# Patient Record
Sex: FEMALE | Race: WHITE | NOT HISPANIC OR LATINO
[De-identification: names, ages, dates, MRNs, and addresses within clinical notes are randomized per-mention and may not be internally consistent; named-entity substitution may affect disease eponyms.]

---

## 2022-10-12 ENCOUNTER — NON-APPOINTMENT (OUTPATIENT)
Age: 32
End: 2022-10-12

## 2022-10-19 ENCOUNTER — APPOINTMENT (OUTPATIENT)
Dept: HUMAN REPRODUCTION | Facility: CLINIC | Age: 32
End: 2022-10-19

## 2022-12-20 ENCOUNTER — NON-APPOINTMENT (OUTPATIENT)
Age: 32
End: 2022-12-20

## 2022-12-20 ENCOUNTER — TRANSCRIPTION ENCOUNTER (OUTPATIENT)
Age: 32
End: 2022-12-20

## 2022-12-20 ENCOUNTER — OUTPATIENT (OUTPATIENT)
Dept: OUTPATIENT SERVICES | Facility: HOSPITAL | Age: 32
LOS: 1 days | End: 2022-12-20
Payer: COMMERCIAL

## 2022-12-20 VITALS
WEIGHT: 190.04 LBS | HEIGHT: 62 IN | OXYGEN SATURATION: 98 % | DIASTOLIC BLOOD PRESSURE: 68 MMHG | HEART RATE: 77 BPM | SYSTOLIC BLOOD PRESSURE: 109 MMHG | TEMPERATURE: 98 F | RESPIRATION RATE: 20 BRPM

## 2022-12-20 DIAGNOSIS — Z90.89 ACQUIRED ABSENCE OF OTHER ORGANS: Chronic | ICD-10-CM

## 2022-12-20 DIAGNOSIS — Z11.52 ENCOUNTER FOR SCREENING FOR COVID-19: ICD-10-CM

## 2022-12-20 DIAGNOSIS — O02.1 MISSED ABORTION: ICD-10-CM

## 2022-12-20 PROBLEM — Z00.00 ENCOUNTER FOR PREVENTIVE HEALTH EXAMINATION: Status: ACTIVE | Noted: 2022-12-20

## 2022-12-20 LAB
ANION GAP SERPL CALC-SCNC: 13 MMOL/L — SIGNIFICANT CHANGE UP (ref 5–17)
BLD GP AB SCN SERPL QL: NEGATIVE — SIGNIFICANT CHANGE UP
BUN SERPL-MCNC: 12 MG/DL — SIGNIFICANT CHANGE UP (ref 7–23)
CALCIUM SERPL-MCNC: 9.5 MG/DL — SIGNIFICANT CHANGE UP (ref 8.4–10.5)
CHLORIDE SERPL-SCNC: 101 MMOL/L — SIGNIFICANT CHANGE UP (ref 96–108)
CO2 SERPL-SCNC: 23 MMOL/L — SIGNIFICANT CHANGE UP (ref 22–31)
CREAT SERPL-MCNC: 0.57 MG/DL — SIGNIFICANT CHANGE UP (ref 0.5–1.3)
EGFR: 122 ML/MIN/1.73M2 — SIGNIFICANT CHANGE UP
GLUCOSE SERPL-MCNC: 85 MG/DL — SIGNIFICANT CHANGE UP (ref 70–99)
HCT VFR BLD CALC: 40.2 % — SIGNIFICANT CHANGE UP (ref 34.5–45)
HGB BLD-MCNC: 13 G/DL — SIGNIFICANT CHANGE UP (ref 11.5–15.5)
MCHC RBC-ENTMCNC: 30 PG — SIGNIFICANT CHANGE UP (ref 27–34)
MCHC RBC-ENTMCNC: 32.3 GM/DL — SIGNIFICANT CHANGE UP (ref 32–36)
MCV RBC AUTO: 92.6 FL — SIGNIFICANT CHANGE UP (ref 80–100)
NRBC # BLD: 0 /100 WBCS — SIGNIFICANT CHANGE UP (ref 0–0)
PLATELET # BLD AUTO: 352 K/UL — SIGNIFICANT CHANGE UP (ref 150–400)
POTASSIUM SERPL-MCNC: 3.9 MMOL/L — SIGNIFICANT CHANGE UP (ref 3.5–5.3)
POTASSIUM SERPL-SCNC: 3.9 MMOL/L — SIGNIFICANT CHANGE UP (ref 3.5–5.3)
RBC # BLD: 4.34 M/UL — SIGNIFICANT CHANGE UP (ref 3.8–5.2)
RBC # FLD: 13.7 % — SIGNIFICANT CHANGE UP (ref 10.3–14.5)
RH IG SCN BLD-IMP: POSITIVE — SIGNIFICANT CHANGE UP
SARS-COV-2 RNA SPEC QL NAA+PROBE: SIGNIFICANT CHANGE UP
SODIUM SERPL-SCNC: 137 MMOL/L — SIGNIFICANT CHANGE UP (ref 135–145)
WBC # BLD: 8.82 K/UL — SIGNIFICANT CHANGE UP (ref 3.8–10.5)
WBC # FLD AUTO: 8.82 K/UL — SIGNIFICANT CHANGE UP (ref 3.8–10.5)

## 2022-12-20 PROCEDURE — U0003: CPT

## 2022-12-20 PROCEDURE — 80048 BASIC METABOLIC PNL TOTAL CA: CPT

## 2022-12-20 PROCEDURE — 36415 COLL VENOUS BLD VENIPUNCTURE: CPT

## 2022-12-20 PROCEDURE — 86901 BLOOD TYPING SEROLOGIC RH(D): CPT

## 2022-12-20 PROCEDURE — 85027 COMPLETE CBC AUTOMATED: CPT

## 2022-12-20 PROCEDURE — C9803: CPT

## 2022-12-20 PROCEDURE — 86900 BLOOD TYPING SEROLOGIC ABO: CPT

## 2022-12-20 PROCEDURE — G0463: CPT

## 2022-12-20 PROCEDURE — 86850 RBC ANTIBODY SCREEN: CPT

## 2022-12-20 PROCEDURE — U0005: CPT

## 2022-12-20 RX ORDER — SODIUM CHLORIDE 9 MG/ML
1000 INJECTION, SOLUTION INTRAVENOUS
Refills: 0 | Status: DISCONTINUED | OUTPATIENT
Start: 2022-12-21 | End: 2023-01-04

## 2022-12-20 RX ORDER — LIDOCAINE HCL 20 MG/ML
0.2 VIAL (ML) INJECTION ONCE
Refills: 0 | Status: DISCONTINUED | OUTPATIENT
Start: 2022-12-21 | End: 2023-01-04

## 2022-12-20 NOTE — H&P PST ADULT - NSICDXPROCEDURE_GEN_ALL_CORE_FT
PROCEDURES:  D&C, uterus, therapeutic, for incomplete, missed, septic, or induced  20-Dec-2022 17:19:51  Naima Romero A

## 2022-12-20 NOTE — H&P PST ADULT - HISTORY OF PRESENT ILLNESS
33 yo female  recent IUI , LMP 10/10/22, began spotting and ultrasound shows no FHR.  now for D&C.    Covid swab 22 [facility]  denies covid exposure

## 2022-12-21 ENCOUNTER — OUTPATIENT (OUTPATIENT)
Dept: OUTPATIENT SERVICES | Facility: HOSPITAL | Age: 32
LOS: 1 days | End: 2022-12-21
Payer: COMMERCIAL

## 2022-12-21 ENCOUNTER — TRANSCRIPTION ENCOUNTER (OUTPATIENT)
Age: 32
End: 2022-12-21

## 2022-12-21 VITALS
RESPIRATION RATE: 16 BRPM | DIASTOLIC BLOOD PRESSURE: 62 MMHG | OXYGEN SATURATION: 100 % | HEART RATE: 77 BPM | TEMPERATURE: 98 F | SYSTOLIC BLOOD PRESSURE: 105 MMHG

## 2022-12-21 VITALS
SYSTOLIC BLOOD PRESSURE: 99 MMHG | RESPIRATION RATE: 16 BRPM | DIASTOLIC BLOOD PRESSURE: 59 MMHG | HEART RATE: 70 BPM | OXYGEN SATURATION: 99 %

## 2022-12-21 DIAGNOSIS — O02.1 MISSED ABORTION: ICD-10-CM

## 2022-12-21 DIAGNOSIS — Z90.89 ACQUIRED ABSENCE OF OTHER ORGANS: Chronic | ICD-10-CM

## 2022-12-21 LAB — RH IG SCN BLD-IMP: POSITIVE — SIGNIFICANT CHANGE UP

## 2022-12-21 PROCEDURE — 88233 TISSUE CULTURE SKIN/BIOPSY: CPT

## 2022-12-21 PROCEDURE — 88305 TISSUE EXAM BY PATHOLOGIST: CPT | Mod: 26

## 2022-12-21 PROCEDURE — 88280 CHROMOSOME KARYOTYPE STUDY: CPT

## 2022-12-21 PROCEDURE — 88305 TISSUE EXAM BY PATHOLOGIST: CPT

## 2022-12-21 PROCEDURE — 88264 CHROMOSOME ANALYSIS 20-25: CPT

## 2022-12-21 PROCEDURE — 59820 CARE OF MISCARRIAGE: CPT

## 2022-12-21 RX ORDER — SERTRALINE 25 MG/1
1 TABLET, FILM COATED ORAL
Qty: 0 | Refills: 0 | DISCHARGE

## 2022-12-21 RX ORDER — METFORMIN HYDROCHLORIDE 850 MG/1
1 TABLET ORAL
Qty: 0 | Refills: 0 | DISCHARGE

## 2022-12-21 RX ORDER — DOXYLAMINE SUCCINATE AND PYRIDOXINE HYDROCHLORIDE, DELAYED RELEASE TABLETS 10 MG/10 MG 10; 10 MG/1; MG/1
2 TABLET, DELAYED RELEASE ORAL
Qty: 0 | Refills: 0 | DISCHARGE

## 2022-12-21 RX ADMIN — SODIUM CHLORIDE 100 MILLILITER(S): 9 INJECTION, SOLUTION INTRAVENOUS at 11:01

## 2022-12-21 NOTE — ASU DISCHARGE PLAN (ADULT/PEDIATRIC) - CARE PROVIDER_API CALL
Fuentes Vera)  Obstetrics and Gynecology  [address] [place] [address], [address]  [place] [address]  Phone: [phone]  Fax: [phone]  Follow Up Time:

## 2022-12-21 NOTE — BRIEF OPERATIVE NOTE - NSICDXBRIEFPROCEDURE_GEN_ALL_CORE_FT
PROCEDURES:  Dilation and curettage, uterus, using suction, for missed first trimester  21-Dec-2022 12:46:54  Fuentes Vera

## 2022-12-21 NOTE — ASU DISCHARGE PLAN (ADULT/PEDIATRIC) - NURSING INSTRUCTIONS
OK to take Tylenol/Acetaminophen at 6:30PM______ for pain and every 6 hours after as needed. OK to take Motrin/Ibuprofen at 6:30PM_____ for pain and every 6 hours after as needed. Take pain medicines alternate.

## 2022-12-21 NOTE — ASU PATIENT PROFILE, ADULT - FALL HARM RISK - UNIVERSAL INTERVENTIONS
Bed in lowest position, wheels locked, appropriate side rails in place/Call bell, personal items and telephone in reach/Instruct patient to call for assistance before getting out of bed or chair/Non-slip footwear when patient is out of bed/[place] to call system/Physically safe environment - no spills, clutter or unnecessary equipment/Purposeful Proactive Rounding/Room/bathroom lighting operational, light cord in reach

## 2023-01-02 LAB — SURGICAL PATHOLOGY STUDY: SIGNIFICANT CHANGE UP

## 2023-01-11 LAB — CHROM ANALY OVERALL INTERP SPEC-IMP: SIGNIFICANT CHANGE UP

## 2023-02-03 ENCOUNTER — APPOINTMENT (OUTPATIENT)
Dept: HUMAN REPRODUCTION | Facility: CLINIC | Age: 33
End: 2023-02-03
Payer: COMMERCIAL

## 2023-02-03 ENCOUNTER — TRANSCRIPTION ENCOUNTER (OUTPATIENT)
Age: 33
End: 2023-02-03

## 2023-02-03 PROCEDURE — 99499PP: CUSTOM

## 2023-03-27 PROBLEM — Z87.42 PERSONAL HISTORY OF OTHER DISEASES OF THE FEMALE GENITAL TRACT: Chronic | Status: ACTIVE | Noted: 2022-12-20

## 2023-03-27 PROBLEM — M26.609 UNSPECIFIED TEMPOROMANDIBULAR JOINT DISORDER, UNSPECIFIED SIDE: Chronic | Status: ACTIVE | Noted: 2022-12-20

## 2023-03-30 NOTE — PRE-ANESTHESIA EVALUATION ADULT - TEMPERATURE IN CELSIUS (DEGREES C)
Is This A New Presentation, Or A Follow-Up?: Rash
36.8
Additional History: Pt states by eye and nose is dry and itchy. Been dry and itchy “for awhile” but redness started few days ago. Uses lotions a few times a week which seems to help.

## 2023-04-05 ENCOUNTER — APPOINTMENT (OUTPATIENT)
Dept: HUMAN REPRODUCTION | Facility: CLINIC | Age: 33
End: 2023-04-05
Payer: COMMERCIAL

## 2023-04-05 PROCEDURE — 76831P: CUSTOM

## 2023-04-05 PROCEDURE — 76831 ECHO EXAM UTERUS: CPT | Mod: NC

## 2023-04-05 PROCEDURE — 58974P52: CUSTOM | Mod: 52

## 2023-04-05 PROCEDURE — 74740 X-RAY FEMALE GENITAL TRACT: CPT | Mod: NC

## 2023-05-02 ENCOUNTER — APPOINTMENT (OUTPATIENT)
Dept: HUMAN REPRODUCTION | Facility: CLINIC | Age: 33
End: 2023-05-02
Payer: COMMERCIAL

## 2023-05-02 PROCEDURE — 99213 OFFICE O/P EST LOW 20 MIN: CPT | Mod: NC

## 2023-05-02 PROCEDURE — 76857 US EXAM PELVIC LIMITED: CPT | Mod: NC

## 2023-05-02 PROCEDURE — 36415 COLL VENOUS BLD VENIPUNCTURE: CPT | Mod: NC

## 2023-05-09 ENCOUNTER — APPOINTMENT (OUTPATIENT)
Dept: HUMAN REPRODUCTION | Facility: CLINIC | Age: 33
End: 2023-05-09
Payer: COMMERCIAL

## 2023-05-09 PROCEDURE — 76857 US EXAM PELVIC LIMITED: CPT | Mod: NC

## 2023-05-09 PROCEDURE — 36415 COLL VENOUS BLD VENIPUNCTURE: CPT | Mod: NC

## 2023-05-09 PROCEDURE — 99213 OFFICE O/P EST LOW 20 MIN: CPT | Mod: NC

## 2023-05-11 ENCOUNTER — APPOINTMENT (OUTPATIENT)
Dept: HUMAN REPRODUCTION | Facility: CLINIC | Age: 33
End: 2023-05-11
Payer: COMMERCIAL

## 2023-05-11 PROCEDURE — S4035P: CUSTOM

## 2023-05-11 PROCEDURE — 76857 US EXAM PELVIC LIMITED: CPT | Mod: NC

## 2023-05-11 PROCEDURE — 36415 COLL VENOUS BLD VENIPUNCTURE: CPT | Mod: NC

## 2023-05-11 PROCEDURE — 99213 OFFICE O/P EST LOW 20 MIN: CPT | Mod: NC

## 2023-05-14 ENCOUNTER — APPOINTMENT (OUTPATIENT)
Dept: HUMAN REPRODUCTION | Facility: CLINIC | Age: 33
End: 2023-05-14

## 2023-06-06 ENCOUNTER — APPOINTMENT (OUTPATIENT)
Dept: HUMAN REPRODUCTION | Facility: CLINIC | Age: 33
End: 2023-06-06
Payer: COMMERCIAL

## 2023-06-06 PROCEDURE — 99213 OFFICE O/P EST LOW 20 MIN: CPT | Mod: 25

## 2023-06-06 PROCEDURE — 76817 TRANSVAGINAL US OBSTETRIC: CPT

## 2023-06-06 PROCEDURE — 36415 COLL VENOUS BLD VENIPUNCTURE: CPT

## 2023-06-13 ENCOUNTER — APPOINTMENT (OUTPATIENT)
Dept: HUMAN REPRODUCTION | Facility: CLINIC | Age: 33
End: 2023-06-13
Payer: COMMERCIAL

## 2023-06-13 PROCEDURE — 99213 OFFICE O/P EST LOW 20 MIN: CPT | Mod: 25

## 2023-06-13 PROCEDURE — 76817 TRANSVAGINAL US OBSTETRIC: CPT

## 2023-06-13 PROCEDURE — 36415 COLL VENOUS BLD VENIPUNCTURE: CPT

## 2023-06-16 ENCOUNTER — APPOINTMENT (OUTPATIENT)
Dept: HUMAN REPRODUCTION | Facility: CLINIC | Age: 33
End: 2023-06-16
Payer: COMMERCIAL

## 2023-06-16 PROCEDURE — 99213 OFFICE O/P EST LOW 20 MIN: CPT | Mod: 25

## 2023-06-16 PROCEDURE — 76857 US EXAM PELVIC LIMITED: CPT

## 2023-07-12 ENCOUNTER — APPOINTMENT (OUTPATIENT)
Dept: OBGYN | Facility: CLINIC | Age: 33
End: 2023-07-12

## 2023-07-21 ENCOUNTER — APPOINTMENT (OUTPATIENT)
Dept: OBGYN | Facility: CLINIC | Age: 33
End: 2023-07-21
Payer: COMMERCIAL

## 2023-07-21 PROCEDURE — 76813 OB US NUCHAL MEAS 1 GEST: CPT

## 2023-07-21 PROCEDURE — 75870 VEIN X-RAY SKULL: CPT

## 2023-07-21 PROCEDURE — 0502F SUBSEQUENT PRENATAL CARE: CPT

## 2023-08-18 ENCOUNTER — APPOINTMENT (OUTPATIENT)
Dept: OBGYN | Facility: CLINIC | Age: 33
End: 2023-08-18
Payer: COMMERCIAL

## 2023-08-18 PROCEDURE — 0502F SUBSEQUENT PRENATAL CARE: CPT

## 2023-08-18 PROCEDURE — 36415 COLL VENOUS BLD VENIPUNCTURE: CPT

## 2023-09-20 ENCOUNTER — APPOINTMENT (OUTPATIENT)
Dept: ANTEPARTUM | Facility: CLINIC | Age: 33
End: 2023-09-20
Payer: COMMERCIAL

## 2023-09-20 ENCOUNTER — ASOB RESULT (OUTPATIENT)
Age: 33
End: 2023-09-20

## 2023-09-20 ENCOUNTER — APPOINTMENT (OUTPATIENT)
Dept: OBGYN | Facility: CLINIC | Age: 33
End: 2023-09-20
Payer: COMMERCIAL

## 2023-09-20 PROCEDURE — 0502F SUBSEQUENT PRENATAL CARE: CPT

## 2023-09-20 PROCEDURE — 76811 OB US DETAILED SNGL FETUS: CPT

## 2023-09-21 NOTE — H&P PST ADULT - ALCOHOL USE HISTORY SINGLE SELECT
Now chest pain-free.  Not a candidate for cardiac catheterization at the current time because of acute kidney injury.  Continue medical management   yes...

## 2023-10-12 ENCOUNTER — APPOINTMENT (OUTPATIENT)
Dept: OBGYN | Facility: CLINIC | Age: 33
End: 2023-10-12
Payer: COMMERCIAL

## 2023-10-12 PROCEDURE — 0502F SUBSEQUENT PRENATAL CARE: CPT

## 2023-11-03 ENCOUNTER — APPOINTMENT (OUTPATIENT)
Dept: PEDIATRIC CARDIOLOGY | Facility: CLINIC | Age: 33
End: 2023-11-03
Payer: COMMERCIAL

## 2023-11-03 PROCEDURE — 76825 ECHO EXAM OF FETAL HEART: CPT

## 2023-11-03 PROCEDURE — 76827 ECHO EXAM OF FETAL HEART: CPT

## 2023-11-03 PROCEDURE — 76821 MIDDLE CEREBRAL ARTERY ECHO: CPT

## 2023-11-03 PROCEDURE — 93325 DOPPLER ECHO COLOR FLOW MAPG: CPT | Mod: 59

## 2023-11-03 PROCEDURE — 99202 OFFICE O/P NEW SF 15 MIN: CPT | Mod: 25

## 2023-11-03 PROCEDURE — 76820 UMBILICAL ARTERY ECHO: CPT

## 2023-11-10 ENCOUNTER — APPOINTMENT (OUTPATIENT)
Dept: OBGYN | Facility: CLINIC | Age: 33
End: 2023-11-10
Payer: COMMERCIAL

## 2023-11-10 PROCEDURE — 90686 IIV4 VACC NO PRSV 0.5 ML IM: CPT

## 2023-11-10 PROCEDURE — 36415 COLL VENOUS BLD VENIPUNCTURE: CPT

## 2023-11-10 PROCEDURE — 90471 IMMUNIZATION ADMIN: CPT

## 2023-11-10 PROCEDURE — 0503F POSTPARTUM CARE VISIT: CPT

## 2023-12-06 ENCOUNTER — APPOINTMENT (OUTPATIENT)
Dept: OBGYN | Facility: CLINIC | Age: 33
End: 2023-12-06
Payer: COMMERCIAL

## 2023-12-06 PROCEDURE — 76819 FETAL BIOPHYS PROFIL W/O NST: CPT | Mod: 59

## 2023-12-06 PROCEDURE — 0502F SUBSEQUENT PRENATAL CARE: CPT

## 2023-12-06 PROCEDURE — 90471 IMMUNIZATION ADMIN: CPT

## 2023-12-06 PROCEDURE — 90715 TDAP VACCINE 7 YRS/> IM: CPT

## 2023-12-06 PROCEDURE — 76816 OB US FOLLOW-UP PER FETUS: CPT

## 2023-12-21 ENCOUNTER — APPOINTMENT (OUTPATIENT)
Dept: OBGYN | Facility: CLINIC | Age: 33
End: 2023-12-21
Payer: COMMERCIAL

## 2023-12-21 PROCEDURE — 0502F SUBSEQUENT PRENATAL CARE: CPT

## 2024-01-05 ENCOUNTER — APPOINTMENT (OUTPATIENT)
Dept: OBGYN | Facility: CLINIC | Age: 34
End: 2024-01-05
Payer: COMMERCIAL

## 2024-01-05 PROCEDURE — 76816 OB US FOLLOW-UP PER FETUS: CPT

## 2024-01-05 PROCEDURE — 76818 FETAL BIOPHYS PROFILE W/NST: CPT | Mod: 59

## 2024-01-05 PROCEDURE — 0502F SUBSEQUENT PRENATAL CARE: CPT

## 2024-01-12 ENCOUNTER — APPOINTMENT (OUTPATIENT)
Dept: OBGYN | Facility: CLINIC | Age: 34
End: 2024-01-12
Payer: COMMERCIAL

## 2024-01-12 PROCEDURE — 0502F SUBSEQUENT PRENATAL CARE: CPT

## 2024-01-12 PROCEDURE — 76818 FETAL BIOPHYS PROFILE W/NST: CPT

## 2024-01-18 ENCOUNTER — APPOINTMENT (OUTPATIENT)
Dept: OBGYN | Facility: CLINIC | Age: 34
End: 2024-01-18
Payer: COMMERCIAL

## 2024-01-18 PROCEDURE — 76816 OB US FOLLOW-UP PER FETUS: CPT

## 2024-01-18 PROCEDURE — 76818 FETAL BIOPHYS PROFILE W/NST: CPT | Mod: 59

## 2024-01-18 PROCEDURE — 0502F SUBSEQUENT PRENATAL CARE: CPT

## 2024-01-26 ENCOUNTER — APPOINTMENT (OUTPATIENT)
Dept: OBGYN | Facility: CLINIC | Age: 34
End: 2024-01-26
Payer: COMMERCIAL

## 2024-01-26 PROCEDURE — 59426 ANTEPARTUM CARE ONLY: CPT

## 2024-01-26 PROCEDURE — 0502F SUBSEQUENT PRENATAL CARE: CPT

## 2024-01-26 PROCEDURE — 76818 FETAL BIOPHYS PROFILE W/NST: CPT

## 2024-02-01 ENCOUNTER — OUTPATIENT (OUTPATIENT)
Dept: OUTPATIENT SERVICES | Facility: HOSPITAL | Age: 34
LOS: 1 days | End: 2024-02-01
Payer: COMMERCIAL

## 2024-02-01 VITALS
RESPIRATION RATE: 18 BRPM | HEART RATE: 78 BPM | SYSTOLIC BLOOD PRESSURE: 122 MMHG | DIASTOLIC BLOOD PRESSURE: 79 MMHG | TEMPERATURE: 98 F | OXYGEN SATURATION: 99 %

## 2024-02-01 DIAGNOSIS — O26.899 OTHER SPECIFIED PREGNANCY RELATED CONDITIONS, UNSPECIFIED TRIMESTER: ICD-10-CM

## 2024-02-01 DIAGNOSIS — Z90.89 ACQUIRED ABSENCE OF OTHER ORGANS: Chronic | ICD-10-CM

## 2024-02-01 PROCEDURE — 59025 FETAL NON-STRESS TEST: CPT | Mod: 26

## 2024-02-01 PROCEDURE — 99221 1ST HOSP IP/OBS SF/LOW 40: CPT | Mod: 25

## 2024-02-01 RX ORDER — SODIUM CHLORIDE 9 MG/ML
1000 INJECTION, SOLUTION INTRAVENOUS ONCE
Refills: 0 | Status: DISCONTINUED | OUTPATIENT
Start: 2024-02-01 | End: 2024-02-16

## 2024-02-01 NOTE — OB PROVIDER TRIAGE NOTE - HISTORY OF PRESENT ILLNESS
OB PA Triage Note     33yoF  @39.6 weeks gestation (CALLIE 24) presents for ROL. Pt c/o painful contractions occurring irregularly every 5-6 minutes since 730p. Pt denies any leakage of fluid or vaginal bleeding. Endorses +FM. PNC uncomplicated. GBS positive. 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 Pt is Dermatologist, and has a .     – PNC: AMA. GBS positive. EFW 3700g. Expecting boy.   – OBHx:   () MAB s/p D&C  () eTOP (medical)   – GynHx: h/o PCOS; denies h/o fibroids, abnl pap smears, STDs  – PMH: denies h/o HTN, DM, asthma, thyroid disorders, bleeding disorders, h/o blood transfusions   – PSH: h/o tonsillectomy and eye surgery (4yo)   – Psych: h/o anxiety/depression (on Zoloft)   – Social: denies alcohol/tobacco/drug use in pregnancy   – Meds: PNV, Metformin 750mg, Zoloft 100mg, Diclegis prn, Zofran prn, Vitamin D, Pepcid, ASA    – Allergies: NKDA  – Will accept blood transfusions: Yes. B+     Vital Signs Last 24 Hrs  T(C): 36.7 (2024 20:46), Max: 36.7 (2024 20:46)  T(F): 98.1 (2024 20:46), Max: 98.1 (2024 20:46)  HR: 86 (2024 21:56) (78 - 94)  BP: 122/79 (2024 20:51) (122/79 - 122/79)  RR: 18 (2024 20:46) (18 - 18)  SpO2: 96% (2024 21:56) (96% - 99%)    Gen: NAD  CV: RRR  Lungs: CTA b/l   Abd: gravid, non-tender  Ext: BLE non-edematous, no calf tenderness b/l     – VE: 1.5/60/-3   – FHT: baseline 1, mod variability, +accels, -decels  – [place]: irregular   – [place]: n/a

## 2024-02-01 NOTE — OB PROVIDER TRIAGE NOTE - NSOBPROVIDERNOTE_OBGYN_ALL_OB_FT
A/P: 33yoF  @39.6 weeks gestation (CALLIE 24) presents for ROL. Pt in early labor, 1-2cm on exam. Irregular contractions every 5-10 minutes. Not requesting epidural for pain mgmt at this time. To be discharged home with labor precautions.   - NST ->Home   - Pt to be d/c home with labor precautions   - Pt to follow up in office as schedule   - Advised to return to L&D and contact OB provider if she experiences any leakage of fluid, vaginal bleeding, painful contractions, or decreased fetal movement   - D/w VALENTIN GilC A/P: 33yoF  @39.6 weeks gestation (CALLIE 24) presents for ROL. Pt in early labor, 1-2cm on exam. Irregular contractions every 5-10 minutes. Not requesting epidural for pain mgmt at this time. To be discharged home with labor precautions.   - NST ->Home   - Pt to be d/c home with labor precautions   - Pt to follow up in office as schedule   - Advised to return to L&D and contact OB provider if she experiences any leakage of fluid, vaginal bleeding, painful contractions, or decreased fetal movement   - D/w Dr. August Miguel, PALUDIN    Addendum:   Pt with indeterminate baseline on FHT, requiring IV fluid hydration and prolonged monitoring.   After 2 hours of continuous monitoring NST reactive baseline 125 moderate variability +accels -decels   Pt feeling mild irregular contractions every 4-6 minutes, not requesting any pain intervention at this time   VE unchanged 1.5/60/-3  Pt to be discharged home with labor precautions   Pt advised to return to L&D and contact OB provider if she experiences any decreased fetal movement, painful contractions, leakage of fluid, or vaginal bleeding   Pt to follow up in office as scheduled   Plan d/w Dr. Ed Miguel, PADeseanC

## 2024-02-01 NOTE — OB RN TRIAGE NOTE - BP NONINVASIVE DIASTOLIC (MM HG)
Ear impressions taken for custom swimmolds.  The patient will be called when the molds arrive from the .   79

## 2024-02-02 ENCOUNTER — APPOINTMENT (OUTPATIENT)
Dept: OBGYN | Facility: CLINIC | Age: 34
End: 2024-02-02
Payer: COMMERCIAL

## 2024-02-02 VITALS
DIASTOLIC BLOOD PRESSURE: 75 MMHG | SYSTOLIC BLOOD PRESSURE: 115 MMHG | TEMPERATURE: 98 F | HEART RATE: 76 BPM | RESPIRATION RATE: 18 BRPM

## 2024-02-02 DIAGNOSIS — O09.523 SUPERVISION OF ELDERLY MULTIGRAVIDA, THIRD TRIMESTER: ICD-10-CM

## 2024-02-02 DIAGNOSIS — O36.8330 MATERNAL CARE FOR ABNORMALITIES OF THE FETAL HEART RATE OR RHYTHM, THIRD TRIMESTER, NOT APPLICABLE OR UNSPECIFIED: ICD-10-CM

## 2024-02-02 DIAGNOSIS — Z3A.39 39 WEEKS GESTATION OF PREGNANCY: ICD-10-CM

## 2024-02-02 DIAGNOSIS — F32.A DEPRESSION, UNSPECIFIED: ICD-10-CM

## 2024-02-02 DIAGNOSIS — F41.9 ANXIETY DISORDER, UNSPECIFIED: ICD-10-CM

## 2024-02-02 DIAGNOSIS — O99.283 ENDOCRINE, NUTRITIONAL AND METABOLIC DISEASES COMPLICATING PREGNANCY, THIRD TRIMESTER: ICD-10-CM

## 2024-02-02 DIAGNOSIS — E28.2 POLYCYSTIC OVARIAN SYNDROME: ICD-10-CM

## 2024-02-02 DIAGNOSIS — O47.1 FALSE LABOR AT OR AFTER 37 COMPLETED WEEKS OF GESTATION: ICD-10-CM

## 2024-02-02 DIAGNOSIS — O09.13 SUPERVISION OF PREGNANCY WITH HISTORY OF ECTOPIC PREGNANCY, THIRD TRIMESTER: ICD-10-CM

## 2024-02-02 DIAGNOSIS — O09.293 SUPERVISION OF PREGNANCY WITH OTHER POOR REPRODUCTIVE OR OBSTETRIC HISTORY, THIRD TRIMESTER: ICD-10-CM

## 2024-02-02 DIAGNOSIS — O99.343 OTHER MENTAL DISORDERS COMPLICATING PREGNANCY, THIRD TRIMESTER: ICD-10-CM

## 2024-02-02 LAB
BASOPHILS # BLD AUTO: 0.02 K/UL — SIGNIFICANT CHANGE UP (ref 0–0.2)
BASOPHILS NFR BLD AUTO: 0.2 % — SIGNIFICANT CHANGE UP (ref 0–2)
EOSINOPHIL # BLD AUTO: 0.02 K/UL — SIGNIFICANT CHANGE UP (ref 0–0.5)
EOSINOPHIL NFR BLD AUTO: 0.2 % — SIGNIFICANT CHANGE UP (ref 0–6)
HCT VFR BLD CALC: 35 % — SIGNIFICANT CHANGE UP (ref 34.5–45)
HGB BLD-MCNC: 11.1 G/DL — LOW (ref 11.5–15.5)
IMM GRANULOCYTES NFR BLD AUTO: 0.2 % — SIGNIFICANT CHANGE UP (ref 0–0.9)
LYMPHOCYTES # BLD AUTO: 2.34 K/UL — SIGNIFICANT CHANGE UP (ref 1–3.3)
LYMPHOCYTES # BLD AUTO: 25.6 % — SIGNIFICANT CHANGE UP (ref 13–44)
MCHC RBC-ENTMCNC: 27.8 PG — SIGNIFICANT CHANGE UP (ref 27–34)
MCHC RBC-ENTMCNC: 31.7 GM/DL — LOW (ref 32–36)
MCV RBC AUTO: 87.7 FL — SIGNIFICANT CHANGE UP (ref 80–100)
MONOCYTES # BLD AUTO: 0.48 K/UL — SIGNIFICANT CHANGE UP (ref 0–0.9)
MONOCYTES NFR BLD AUTO: 5.3 % — SIGNIFICANT CHANGE UP (ref 2–14)
NEUTROPHILS # BLD AUTO: 6.26 K/UL — SIGNIFICANT CHANGE UP (ref 1.8–7.4)
NEUTROPHILS NFR BLD AUTO: 68.5 % — SIGNIFICANT CHANGE UP (ref 43–77)
NRBC # BLD: 0 /100 WBCS — SIGNIFICANT CHANGE UP (ref 0–0)
PLATELET # BLD AUTO: 278 K/UL — SIGNIFICANT CHANGE UP (ref 150–400)
RBC # BLD: 3.99 M/UL — SIGNIFICANT CHANGE UP (ref 3.8–5.2)
RBC # FLD: 14.6 % — HIGH (ref 10.3–14.5)
WBC # BLD: 9.14 K/UL — SIGNIFICANT CHANGE UP (ref 3.8–10.5)
WBC # FLD AUTO: 9.14 K/UL — SIGNIFICANT CHANGE UP (ref 3.8–10.5)

## 2024-02-02 PROCEDURE — 86901 BLOOD TYPING SEROLOGIC RH(D): CPT

## 2024-02-02 PROCEDURE — 59025 FETAL NON-STRESS TEST: CPT

## 2024-02-02 PROCEDURE — 86850 RBC ANTIBODY SCREEN: CPT

## 2024-02-02 PROCEDURE — 86900 BLOOD TYPING SEROLOGIC ABO: CPT

## 2024-02-02 PROCEDURE — 85025 COMPLETE CBC W/AUTO DIFF WBC: CPT

## 2024-02-02 PROCEDURE — 86780 TREPONEMA PALLIDUM: CPT

## 2024-02-02 PROCEDURE — 76818 FETAL BIOPHYS PROFILE W/NST: CPT

## 2024-02-02 PROCEDURE — 96360 HYDRATION IV INFUSION INIT: CPT

## 2024-02-02 PROCEDURE — 0502F SUBSEQUENT PRENATAL CARE: CPT

## 2024-02-02 PROCEDURE — G0463: CPT

## 2024-02-02 RX ORDER — SODIUM CHLORIDE 9 MG/ML
1000 INJECTION, SOLUTION INTRAVENOUS
Refills: 0 | Status: DISCONTINUED | OUTPATIENT
Start: 2024-02-02 | End: 2024-02-16

## 2024-02-03 ENCOUNTER — OUTPATIENT (OUTPATIENT)
Dept: OUTPATIENT SERVICES | Facility: HOSPITAL | Age: 34
LOS: 1 days | End: 2024-02-03
Payer: COMMERCIAL

## 2024-02-03 ENCOUNTER — INPATIENT (INPATIENT)
Facility: HOSPITAL | Age: 34
LOS: 2 days | Discharge: HOME | End: 2024-02-06
Attending: OBSTETRICS & GYNECOLOGY | Admitting: OBSTETRICS & GYNECOLOGY
Payer: COMMERCIAL

## 2024-02-03 VITALS
SYSTOLIC BLOOD PRESSURE: 127 MMHG | RESPIRATION RATE: 18 BRPM | HEART RATE: 94 BPM | TEMPERATURE: 98 F | OXYGEN SATURATION: 98 % | DIASTOLIC BLOOD PRESSURE: 84 MMHG

## 2024-02-03 VITALS — OXYGEN SATURATION: 97 %

## 2024-02-03 VITALS — OXYGEN SATURATION: 92 % | HEART RATE: 81 BPM

## 2024-02-03 DIAGNOSIS — O26.899 OTHER SPECIFIED PREGNANCY RELATED CONDITIONS, UNSPECIFIED TRIMESTER: ICD-10-CM

## 2024-02-03 DIAGNOSIS — Z34.80 ENCOUNTER FOR SUPERVISION OF OTHER NORMAL PREGNANCY, UNSPECIFIED TRIMESTER: ICD-10-CM

## 2024-02-03 DIAGNOSIS — Z90.89 ACQUIRED ABSENCE OF OTHER ORGANS: Chronic | ICD-10-CM

## 2024-02-03 DIAGNOSIS — Z3A.40 40 WEEKS GESTATION OF PREGNANCY: ICD-10-CM

## 2024-02-03 LAB
BASOPHILS # BLD AUTO: 0.03 K/UL — SIGNIFICANT CHANGE UP (ref 0–0.2)
BASOPHILS NFR BLD AUTO: 0.3 % — SIGNIFICANT CHANGE UP (ref 0–2)
BLD GP AB SCN SERPL QL: NEGATIVE — SIGNIFICANT CHANGE UP
EOSINOPHIL # BLD AUTO: 0.02 K/UL — SIGNIFICANT CHANGE UP (ref 0–0.5)
EOSINOPHIL NFR BLD AUTO: 0.2 % — SIGNIFICANT CHANGE UP (ref 0–6)
HCT VFR BLD CALC: 35.2 % — SIGNIFICANT CHANGE UP (ref 34.5–45)
HGB BLD-MCNC: 10.9 G/DL — LOW (ref 11.5–15.5)
IMM GRANULOCYTES NFR BLD AUTO: 0.4 % — SIGNIFICANT CHANGE UP (ref 0–0.9)
LYMPHOCYTES # BLD AUTO: 2.18 K/UL — SIGNIFICANT CHANGE UP (ref 1–3.3)
LYMPHOCYTES # BLD AUTO: 22.5 % — SIGNIFICANT CHANGE UP (ref 13–44)
MCHC RBC-ENTMCNC: 27.4 PG — SIGNIFICANT CHANGE UP (ref 27–34)
MCHC RBC-ENTMCNC: 31 GM/DL — LOW (ref 32–36)
MCV RBC AUTO: 88.4 FL — SIGNIFICANT CHANGE UP (ref 80–100)
MONOCYTES # BLD AUTO: 0.51 K/UL — SIGNIFICANT CHANGE UP (ref 0–0.9)
MONOCYTES NFR BLD AUTO: 5.3 % — SIGNIFICANT CHANGE UP (ref 2–14)
NEUTROPHILS # BLD AUTO: 6.93 K/UL — SIGNIFICANT CHANGE UP (ref 1.8–7.4)
NEUTROPHILS NFR BLD AUTO: 71.3 % — SIGNIFICANT CHANGE UP (ref 43–77)
NRBC # BLD: 0 /100 WBCS — SIGNIFICANT CHANGE UP (ref 0–0)
PLATELET # BLD AUTO: 282 K/UL — SIGNIFICANT CHANGE UP (ref 150–400)
RBC # BLD: 3.98 M/UL — SIGNIFICANT CHANGE UP (ref 3.8–5.2)
RBC # FLD: 14.8 % — HIGH (ref 10.3–14.5)
RH IG SCN BLD-IMP: POSITIVE — SIGNIFICANT CHANGE UP
T PALLIDUM AB TITR SER: NEGATIVE — SIGNIFICANT CHANGE UP
WBC # BLD: 9.71 K/UL — SIGNIFICANT CHANGE UP (ref 3.8–10.5)
WBC # FLD AUTO: 9.71 K/UL — SIGNIFICANT CHANGE UP (ref 3.8–10.5)

## 2024-02-03 PROCEDURE — 59410 OBSTETRICAL CARE: CPT

## 2024-02-03 PROCEDURE — G0463: CPT

## 2024-02-03 RX ORDER — SODIUM CHLORIDE 9 MG/ML
1000 INJECTION, SOLUTION INTRAVENOUS
Refills: 0 | Status: DISCONTINUED | OUTPATIENT
Start: 2024-02-03 | End: 2024-02-04

## 2024-02-03 RX ORDER — METFORMIN HYDROCHLORIDE 850 MG/1
750 TABLET ORAL AT BEDTIME
Refills: 0 | Status: DISCONTINUED | OUTPATIENT
Start: 2024-02-03 | End: 2024-02-06

## 2024-02-03 RX ORDER — CITRIC ACID/SODIUM CITRATE 300-500 MG
15 SOLUTION, ORAL ORAL EVERY 6 HOURS
Refills: 0 | Status: DISCONTINUED | OUTPATIENT
Start: 2024-02-03 | End: 2024-02-04

## 2024-02-03 RX ORDER — DIPHENHYDRAMINE HCL 50 MG
25 CAPSULE ORAL ONCE
Refills: 0 | Status: COMPLETED | OUTPATIENT
Start: 2024-02-03 | End: 2024-02-03

## 2024-02-03 RX ORDER — OXYTOCIN 10 UNIT/ML
4 VIAL (ML) INJECTION
Qty: 30 | Refills: 0 | Status: DISCONTINUED | OUTPATIENT
Start: 2024-02-03 | End: 2024-02-03

## 2024-02-03 RX ORDER — POLYETHYLENE GLYCOL 3350 17 G/17G
17 POWDER, FOR SOLUTION ORAL ONCE
Refills: 0 | Status: COMPLETED | OUTPATIENT
Start: 2024-02-03 | End: 2024-02-04

## 2024-02-03 RX ORDER — AMPICILLIN TRIHYDRATE 250 MG
2 CAPSULE ORAL ONCE
Refills: 0 | Status: COMPLETED | OUTPATIENT
Start: 2024-02-03 | End: 2024-02-03

## 2024-02-03 RX ORDER — SERTRALINE 25 MG/1
100 TABLET, FILM COATED ORAL DAILY
Refills: 0 | Status: DISCONTINUED | OUTPATIENT
Start: 2024-02-03 | End: 2024-02-06

## 2024-02-03 RX ORDER — OXYTOCIN 10 UNIT/ML
333.33 VIAL (ML) INJECTION
Qty: 20 | Refills: 0 | Status: DISCONTINUED | OUTPATIENT
Start: 2024-02-03 | End: 2024-02-06

## 2024-02-03 RX ORDER — CHLORHEXIDINE GLUCONATE 213 G/1000ML
1 SOLUTION TOPICAL DAILY
Refills: 0 | Status: DISCONTINUED | OUTPATIENT
Start: 2024-02-03 | End: 2024-02-04

## 2024-02-03 RX ORDER — AMPICILLIN TRIHYDRATE 250 MG
1 CAPSULE ORAL EVERY 4 HOURS
Refills: 0 | Status: DISCONTINUED | OUTPATIENT
Start: 2024-02-03 | End: 2024-02-04

## 2024-02-03 RX ORDER — FAMOTIDINE 10 MG/ML
20 INJECTION INTRAVENOUS DAILY
Refills: 0 | Status: DISCONTINUED | OUTPATIENT
Start: 2024-02-03 | End: 2024-02-06

## 2024-02-03 RX ORDER — OXYTOCIN 10 UNIT/ML
2 VIAL (ML) INJECTION
Qty: 30 | Refills: 0 | Status: DISCONTINUED | OUTPATIENT
Start: 2024-02-03 | End: 2024-02-06

## 2024-02-03 RX ADMIN — Medication 200 GRAM(S): at 18:47

## 2024-02-03 RX ADMIN — Medication 25 MILLIGRAM(S): at 23:39

## 2024-02-03 RX ADMIN — Medication 108 GRAM(S): at 23:38

## 2024-02-03 RX ADMIN — SODIUM CHLORIDE 125 MILLILITER(S): 9 INJECTION, SOLUTION INTRAVENOUS at 18:30

## 2024-02-03 RX ADMIN — Medication 2 MILLIUNIT(S)/MIN: at 19:51

## 2024-02-03 RX ADMIN — SODIUM CHLORIDE 125 MILLILITER(S): 9 INJECTION, SOLUTION INTRAVENOUS at 18:57

## 2024-02-03 NOTE — OB RN TRIAGE NOTE - NS_TRIAGEPROVIDERNOTIFIEDBY_OBGYN_ALL_OB_FT
Xerosis Aggressive Treatment: I recommended application of Cetaphil or CeraVe numerous times a day going to bed to all dry areas. I also prescribed a topical steroid for twice daily use. Foster RN

## 2024-02-03 NOTE — OB RN TRIAGE NOTE - NSICDXPASTMEDICALHX_GEN_ALL_CORE_FT
I was called about  A positive strep PCR, I called mother Monisha and left a detailed message and number to call back. I sent amoxicillin to preferred pharmacy.    Marialuisa Roberts MD on 1/7/2023 at 11:01 AM    
PAST MEDICAL HISTORY:  History of PCOS     Temporomandibular joint disorder (TMJ)

## 2024-02-03 NOTE — OB PROVIDER H&P - NS_FINALEDD_OBGYN_ALL_OB_DT
Outpatient Physical Therapy     [x] Daily Treatment Note   [] Progress Note   [] Discharge Note     Date:  9/21/2020    Patient Name:  Rayne Mata         YOB: 1959    Medical Diagnosis: unspecified disorder of vestibular system H81.90 ; ORIF clavicle, I S42.022K displaced fx shaft of L clavicle                                                    Treatment Diagnosis:  Imbalance, dizziness/vertigo ;  L shoulder pain, decreased mobility, weakness          Onset Date:    2/14/2019             Referral Date:  7/17/20 (vestibular)  / 7/13/20 (shoulder)              Referring Physician:  Lili Forman (vestibular) / Dr. Leticia Soler (shoulder)                                                    Visits Allowed/Insurance/Certification Information:  Vestibular [facility], 16 visits approved through 9/25/20  / shoulder [facility], 12 visits approved until 8/21/20  /  C9 approved for cervical treatment 2-3x6 09/11/2020-10/31/2020     Restrictions/Precautions: HTN, DM, hx of upper cervical fractures, recent neck surgery 6/2020     Plan of care sent to provider Ethel Pastrana):      [x]Faxed  []Co-signature    (attempts: 1[x] 2 []3[])      Plan of care signed:      [x]Yes date: 7/27/20              Plan of care sent to provider Ajit Case):      [x]Faxed  []Co-signature    (attempts: 1[x] 2 []3[])     Plan of care signed:      [x]Yes date:   7/23/20      Plan of care sent to provider [facility] fax # [phone]):      [x]Faxed 9/2/20  []Co-signature    (attempts: 1[x] 2 [x] 9/14/20 3[x] 9/21/20)     Plan of care signed:      []Yes date:     [x]No         Progress Note covers period from (if applicable):    [x]NA    []From 8/12/20 to 9/16/20 for vestibular dx     Next Progress Note due:   By 10/1/20 for cervical diagnosis    Visit# / total visits:   Cervical  5/12     Plan for Next Session:     Manual therapy to cervical/thoracic/ribs with caution to C5-6 fusion and upper cervical healed fractures   Progress cervical strength, and L arm/ strength    Subjective:   Pt states he has had some spasms on B neck muscles. Wife present today asking for update on pt's condition, pt agreeable. Pain level:  3-4/10 cervical tension, pain as high as 5/10 in past few days     AT EVAL:   Patient reports L shoulder pain is  0/10 pain at present and  8/10 pain at its worst.  Pt also reports pain from cervical spine that radiates to L trap and goes to L elbow, pt reports constant numbness ventral fingers L hand and weakness into L hand. Also neck  pain,                                      4/10 pain at present   6/10 pain at its worst      Objective:     Operation:    1. C5/C6 anterior cervical discectomies and foraminotomies. 2. C5/C6 anterior cervical discectomy and fusion with Dep[employer]-Synthes Interbody cage and plating system. 3. Use of intraoperative microscope  4. Use of intraoperative fluoroscopy      Exercises:    Exercises in bold performed in department today. Items not bolded are carried forward from prior visits for continuity of the record. Exercise/Equipment Resistance/Repetitions HEP Other comments       []      L Shoulder Diagnosis:   []       pendulums 20x CW and 20x CCW [x] Reviewed       flexion supine x10 [x] Advised to progress into newly obtained range      ER with cane at 90 deg abd 10 x 10 sec [x] Reviewed and corrected. Tolerating well. Sleeper stretch 10 x 10 sec [x] Reviewed and corrected form       Serratus ceilng punch         Mid rows (seated ok for now) Red, 1x10 [x] Progressed to blue band      Serratus punch  5#, 5x12 (pt did more than asked) [x]       Triceps ext supinea 5# 3x12-15        Prone shoulder extension to hip with scap retraction  5#, 3x15 [x] Cues to maintain retraction, to reduce crepitus with extension. Advanced to 5 lbs to check form because pt states he is using 5 lbs at home.      Prone row  5#, 3x15  [x]   Crepitus noted in scapula     Prone horizontal abduction  With scap retraction 3#, 3X15  [x] Unable to maintain form with 5#      Prone scaption  with arm off edge of bed (prone Y with arm off edge) 0#, 3x15   [x]   Cues to keep thumb up      SL ER  2# 2X10 [] With scapular retraction      Shoulder IR with tband Green, 3x12  [x]        Wall push ups  x10  [x]   Significant winging of scapula noted. Wall push up with serratus plus 2x10  Noted significant winging of scapula     Rhythmic stabilization 3x30 sec, distal perturbations []      PNF D1 and D2 UE patterns in   standing                                                          2x10 each   [] Gentle assist to achieve full ROM     Supine active flexion  3# 1x10     Standing Abduction  2x10     Standing  Forward flexion  2x10      Manually resisted scap retraction in SL 2x10, mod resistance  Much crepitus noted   Standing wall slide  2x10     SL HAB (LUE) x10  1# x10  Cuing to incr eccentric control and maintaining scapular retraction with eccentric lowering     Vestibular Diagnosis:    []      Eye Exercises:         Saccades, seated  Pt inst to do this exercise x 1 minute in horizontal direction and x 1 minute in vertical direction. [x]   Reviewed, continue     Corrective saccades, seated, week 2 Pt inst to do this exercise x 2 minute in horizontal direction and x 2 minute in vertical direction, 2x/day  [x]   Reviewed, continue     VOR x 1 viewing, standing, close target Pt inst to do this exercise x 1 minute with horizontal head turns and x 1 minute with vertical head nods. Pt to only go as fast as he/she can while keeping target clear and in focus. Pt inst to do this ex 3x/day. 25 reps/min Horizontal       30 reps/min vertical          VOR x 1 viewing, standing, far target, week 2 Pt inst to do this exercise x 1 minute with horizontal head turns and x 1 minute with vertical head nods. Pt to only go as fast as he/she can while keeping target clear and in focus. Pt inst to do this ex 2x/day.      NT today reps Horizontal  NT today reps vertical      VOR Checkerboard, week 4 close target Pt to cont to practice to keep in focus  Continue   VOR x 2 viewing, week 4 1 min each direction standing   continue     Habituation Exercises:         Rolling B - pt states he has not been doing this ex even though PT has been asking him about it each visit and he states he is still dizzy with exercise. Reviewed, 5 repetitions (Rolling R, center, rolling L, center)   Pt to do 2 times per day  [x]   Pt to do some with eyes open and some with eyes closed. supine to longsit and vice versa- pt states he has been doing this one but needed cues  Reviewed, 5 repetitions (sit up, lie back down)   Pt to do 2 times per day  continue   Nose to knee5 repetitions (nose to R knee, back up, nose to L knee and back up), 2x/day. continue    Discontinued    PNF Wall taps in standing 2x10       scifit  Seat 8, with arms Level 4.0, 10 minutes   At end of session       Bridges  2x15 [x]      Standing heel raises 2x15 [x]      Standing ABD  2#, 2x10 B []      Step ups 8\" 2x10 B  [] Single UE     Step ups onto 6\" step, blue foam 2x20 []      Static stance on Blue foam  Conditions   Pt tends to lean posteriorly.      March in place on blue foam  2x20   Single R UE    Static Stance on firm surface ;   Repeated On blue foam Feet apart: conditions 1-6  Feet together: conditions 1-6    In corner    Step up and over, 8\" step  Lateral step up and over 6\" step 1x10 B UE; 1x10 L UE alternating pattern  1x10 BUE; 1x10 RUE    Single UE for balance   Lateral eccentric step down, 4\" step 2x10 B  Bilateral UE for balance   Lateral cone step over at // bars 2x  Bilateral UE   Step over cone, forward @ // bars  3x B  B UE, Single UE, No UE          Cervical Diagnosis:        cervical AROM  As tolerated      gripping stress ball  As tolerated      Theraputty:    [x]         [place] 10 beans  Green putty         squeeze and rotate putty   \"         Roll out donut and spread with                fingers   \"         Roll out and pinch with fingers   \"         Wrist flexion curls 3# pt doing 3x15  [x]    verbally reviewed today, pt inst to increase to 5#, 3x10       Wrist extension curls  2# 3x10  [x]   Verbally reviewed, remain at 2#       Supination/pronation with elbow at side  3# pt doing 3x15  [x]    verbally reviewed today, pt inst to increase to 5#, 3x10       EZ velcro board  2 min large roller wrist ext/flexion  2 min large roller sup/pron        Self thoracic mob with towel roll vertically and horizontally  1-2 minutes, pt given handout  [x]   Verbally reviewed      Supine deep neck flexors chin nod  Hold 10 sec, 10 reps 2x/day  [x]                          Therapeutic Exercise/Home Exercise Program:   23 minutes (2units) ([id number])  Reassessed for progress note for vestibular diagnosis:   LE strength:   B hip abd 5/5  B hip add 5/5  B hip extension 5/5  gastrocs in WB  4+/5 B    HEP has been established, See above under cervical dx,  Reviewed and progressed  Pt inst to do UE and LE strength and ROM exs ONLY 1 TIME PER DAY. Access Code: [email or web address]   URL: [email or web address]   Date: 09/02/2020   Prepared by: Beulah Jackson     Exercises   Circular Shoulder Pendulum with Table Support - 20 reps - 2 sets - 2x daily - 7x weekly   Supine Shoulder Flexion AAROM - 10 reps - 10 hold - 2x daily - 7x weekly   Supine Shoulder External Internal Rotation AAROM with Dowel - 10 reps - 10 hold - 2x daily - 7x weekly   Sleeper Stretch - 10 reps - 10 hold - 2x daily - 7x weekly   Standing Shoulder Row with Anchored Resistance - 10-15 reps - 3 sets - 1x daily - 7x weekly   Bridge - 10-15 reps - 3 sets - 1x daily - 7x weekly   Heel rises with counter support - 10-15 reps - 3 sets - 1x daily - 7x weekly   Supine Single Arm Shoulder Protraction - 10-15 reps - 3 sets - 1x daily - 7x weekly   Prone Shoulder Extension - Single Arm - 10-15 reps - 3 sets - 1x daily - 7x weekly Supine Triceps Extension with Resistance - 10 reps - 3 sets - 1x daily - 7x weekly   Supine Elbow Flexion Extension with Dumbbell - 10-15 reps - 3 sets - 1x daily - 7x weekly   Sidelying Shoulder ER with Towel and Dumbbell - 10-15 reps - 3 sets - 1x daily - 7x weekly   Prone Shoulder Row - 10-15 reps - 3 sets - 1x daily - 7x weekly   Shoulder Internal Rotation with Resistance - 10-15 reps - 3 sets - 1x daily - 7x weekly   Prone Single Arm Shoulder Horizontal Abduction with Dumbbell - Palm Down - 10-15 reps - 3 sets - 1x daily - 7x weekly   Wall Push Up - 10-15 reps - 3 sets - 1x daily - 7x weekly   Prone Single Arm Shoulder Y with Dumbbell - 10-15 reps - 3 sets - 1x daily - 7x weekly   Supine Shoulder Flexion with Free Weight - 10 reps - 3 sets - 1x daily - 7x weekly   standing shoulder flexion - 10 reps - 3 sets - 1x daily - 7x weekly   Standing Alternating Shoulder Abduction - 10 reps - 3 sets - 1x daily - 7x weekly         Therapeutic Activity:  0 minutes      Gait: 0 minutes      Neuromuscular Re-Education:  20 minutes  [id number] (1units)  Pt's wife asking for update on all diagnoses. Discussed recent progress/discharge note, progress, phone conversation with [facility] , shoulder exercises and prognosis, plan for return to work, recommendation for driving evaluation and therapy with OT, plan for cervical diagnosis and treatment.    All pt and pt's wifes questions were answered to their satisfaction       Canal[place] Repositioning Procedure:  0 minutes    Manual Therapy:  37 Minutes [id number] (2 units)  Prone thoracic PAs  Prone costotransverse springing B  Upper thoracic PAs/prayer hands  2nd rib mob B  Supine 1st rib mob B  SOR (no distraction)  Supine gentle cervical PROM rot B  Manually resisted isometric rot B and extension, light resistance     Modalities: 0 minutes    Functional Outcome Measure:     Measure Used: Dizziness Handicap Inventory   Score: 46/100  Date Assessed: 7/22/20     Measure Used: Dizziness Handicap Inventory   Score: 60/100  Date Assessed: 8/12/20    Measure Used: Dizziness Handicap Inventory   Score: 28/100  Date Assessed: 9/16/20      Assessment/Treatment/Activity Tolerance:         Patients response to treatment:   [x]Patient tolerated treatment well []Patient limited by fatigue   []Patient limited by pain  []Patient limited by other medical complications   []Other:     Goals:     Vestibular GOALS  Discharged 9/16/20  Short Term Goals:   4 weeks MET NOT MET Long Term Goals:  8  weeks MET NOT MET   1). Establish HEP [x]?  []?  1). Pt independent HEP [x]?  []?    2). Increase strength 1/3 grade in areas of deficits [x]?  []?  2). Increase strength to 4+/5 or better [x]?  []?    3). Improve [place] to 19/20 [x]?  []?  3). Improve [place] to 24/28 PROGRESSING TOWARDS []?  []?    4). Improve DHI score to 36 or less  [x]?  []?  4). Improve DHI score to 26 or less PROGRESSING TOWARDS  []?  []?    5). Pt able to ambulate household distances without AD [x]?  []?  5). Pt able to do flight of steps with reciprocal pattern with 1 rail  [x]?  []?    6). []?  []?  6). Pt able to ambulate community distances without AD  [x]?  []?        Shoulder  GOALS   Discharged 9/2/20  Short Term Goals:   3  weeks MET Not MET Long Term Goals:    6 weeks MET Not Met   Establish HEP [x]?  []?  Pt independent with HEP [x]?  []?    Pain  5/10 or less [x]?  []?  Pain  3/10 or less [x]?  []?    Increase identified strength deficits 1/3 grade  [x]?  []?  Increase strength to 4+/5 or greater [x]?  []?    Increase UE PROM 20 degrees with IR and ER  And 10 degrees with abduction and flexion  [x]?  []?  Achieve WFL AROM  [x]?  []?    Report increased tolerance to ADLs without increased pain  [x]?  []?  Return to all ADLS without limitation []?  [x]?      []?  []?    []?  []?      Cervical GOALS  established 9/2/20   Short Term Goals:   3  weeks MET Not Met Long Term Goals:   6  weeks MET Not Met   Establish HEP []?  02-Feb-2024

## 2024-02-03 NOTE — OB PROVIDER H&P - PROBLEM SELECTOR PLAN 1
- Admit to L & D/labs/IVF/clear  - Fetal status - cat 1  - GBS + --> ampicillin prophylaxis  - Pain control - as needed  - Labor management- will start pitocin augmentation   - Consents to be signed  D/W Dr Ed Rodriguez PA-C

## 2024-02-03 NOTE — OB PROVIDER H&P - ATTENDING COMMENTS
34yo    @ 40w 1 d    Admitted in early labor    admit  labs  consents  efm/toco  IVH/clears  epidural prn  will start pitocin    Esther Ward  OB attg

## 2024-02-03 NOTE — OB PROVIDER TRIAGE NOTE - HISTORY OF PRESENT ILLNESS
OB PA Triage Note     33yoF  @40.1 weeks gestation (CALLIE 24) presents for ROL. Pt c/o painful contractions occurring regularly every 3-4 minutes since. Denies any leakage of fluid or vaginal bleeding. Endorses +FM. PNC uncomplicated. GBS positive. 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     – PNC: AMA. GBS positive. EFW 3700g. Expecting boy.   – OBHx:   () MAB s/p D&C  () eTOP (medical)   – GynHx: h/o PCOS; denies h/o fibroids, abnl pap smears, STDs  – PMH: denies h/o HTN, DM, asthma, thyroid disorders, bleeding disorders, h/o blood transfusions   – PSH: h/o tonsillectomy and eye surgery (2yo)   – Psych: h/o anxiety/depression (on Zoloft)   – Social: denies alcohol/tobacco/drug use in pregnancy   – Meds: PNV, Metformin 750mg, Zoloft 100mg, Diclegis prn, Zofran prn, Vitamin D, Pepcid, ASA    – Allergies: NKDA  – Will accept blood transfusions: Yes. B+     Vital Signs Last 24 Hrs  T(C): 36.9 (2024 04:53), Max: 36.9 (2024 04:46)  T(F): 98.42 (2024 04:53), Max: 98.42 (2024 04:53)  HR: 81 (2024 05:19) (80 - 94)  BP: 127/84 (2024 04:53) (127/84 - 127/84)  RR: 18 (2024 04:53) (18 - 18)  SpO2: 92% (2024 05:19) (92% - 99%)    Gen: NAD  CV: RRR  Lungs: CTA b/l   Abd: gravid, non-tender  Ext: BLE non-edematous, no calf tenderness b/l     – VE: 3/80/-3   – FHT: baseline 130, mod variability, +accels, -decels  – [place]: q2-4 mins

## 2024-02-03 NOTE — OB PROVIDER H&P - HISTORY OF PRESENT ILLNESS
34yo   @ 40w 1 d    presents c/o irregular contractions  Pt was here earlier in the morning and had made change from 1.5cm to 3cm but decided she wanted to ambulate  Pt now having increased contraction pain, denies LOF or VB has + FM      PNC: Dr Ward  PNI: uncomplicated  PNL: GBS positive    All: No Known Allergies  Meds: PNV, metformin 750mg, zoloft 100mg, diclegis prn, zofran prn, vitamin D, Pepcid, ASA  PMHx: Anxiety/Depression  PSHx: D& C, h/o tonsillectomy, eye surgery  Socialhx: Denies x 3  OBhx: 2022  MAB  s/p D & C  2018  [facility](medical)  GYNhx: h/o PCOS, denies fibroids or STDs or abnormal pap smears    T(C): 36.7 (02-03-24 @ 16:41), Max: 36.9 (02-03-24 @ 04:46)  HR: 80 (02-03-24 @ 17:38) (74 - 94)  BP: 122/77 (02-03-24 @ 16:41) (122/77 - 127/84)  RR: 18 (02-03-24 @ 16:41) (18 - 18)  SpO2: 94% (02-03-24 @ 17:38) (91% - 100%)    Gen: NAD  Heart: RRR  Lungs: CTA B/L  Abdomen: Gravid, NT  Ext: no calf tenderness    NST: 120's moderate variability + accels no decels  TOCO:irregular  VE: 3/80/-3  EFW: 3700  BSUS: vtx

## 2024-02-03 NOTE — OB PROVIDER TRIAGE NOTE - NSLOWDOSEASPIRIN_OBGYN_ALL_OB
Physical Therapy Cancellation Note         05/25/22 1000   PT Last Visit   PT Visit Date 05/25/22   Note Type   Note type Evaluation   Cancel Reasons Medical status   Additional Comments Camille Perez RT & Yvette Trejo cardio PA-C advised holding assessment at this time  Pt  currently receiving breathing treatment, concern of current fluid retention       Roxi Sports, PT Yes

## 2024-02-03 NOTE — OB PROVIDER H&P - NS_PHYSICALALLNEG_OBGYN_ALL_OB
-Maze ablation with previous DCCV  -Worsening rate control, currently on lopressor and Amiodarone 200 mh QD, increased frequency of Metoprolol to 100 mg TID for better rate control; HR  ( HR ) per cardiology recommendations  -Discontinued warfarin and started on heparin gtt, was supposed to be held at 3AM on 8/17/2017 for AKA but pt now in MICU        All other review of systems negative, except as noted in HPI

## 2024-02-03 NOTE — OB PROVIDER H&P - NSLOWPPHRISK_OBGYN_A_OB
No previous uterine incision/Vilchis Pregnancy/Less than or equal to 4 previous vaginal births/No known bleeding disorder/No history of postpartum hemorrhage

## 2024-02-03 NOTE — OB PROVIDER TRIAGE NOTE - NSOBPROVIDERNOTE_OBGYN_ALL_OB_FT
A/P: 33yoF  @40.1 weeks gestation (CALLIE 24) presents for r/o labor. Pt c/o contractions every 4-5 minutes, not requesting epidural for pain mgmt at this time. Pt's mother and  at bedside. Requesting to go walking in lobby and will return when she wants pain intervention.   NST reactive 125 baseline +accels -decels   Pt advised to return to L&D and contact OB provider if she experiences any decreased fetal movement, painful contractions, leakage of fluid, or vaginal bleeding   Pt to return to L&D when she requires epidural for pain intervention   Plan d/w Dr. Ed Miguel, PA-C

## 2024-02-04 LAB — T PALLIDUM AB TITR SER: NEGATIVE — SIGNIFICANT CHANGE UP

## 2024-02-04 RX ORDER — ACETAMINOPHEN 500 MG
975 TABLET ORAL
Refills: 0 | Status: DISCONTINUED | OUTPATIENT
Start: 2024-02-04 | End: 2024-02-06

## 2024-02-04 RX ORDER — OXYTOCIN 10 UNIT/ML
41.67 VIAL (ML) INJECTION
Qty: 20 | Refills: 0 | Status: DISCONTINUED | OUTPATIENT
Start: 2024-02-04 | End: 2024-02-06

## 2024-02-04 RX ORDER — AER TRAVELER 0.5 G/1
1 SOLUTION RECTAL; TOPICAL EVERY 4 HOURS
Refills: 0 | Status: DISCONTINUED | OUTPATIENT
Start: 2024-02-04 | End: 2024-02-06

## 2024-02-04 RX ORDER — PRAMOXINE HYDROCHLORIDE 150 MG/15G
1 AEROSOL, FOAM RECTAL EVERY 4 HOURS
Refills: 0 | Status: DISCONTINUED | OUTPATIENT
Start: 2024-02-04 | End: 2024-02-06

## 2024-02-04 RX ORDER — MAGNESIUM HYDROXIDE 400 MG/1
30 TABLET, CHEWABLE ORAL
Refills: 0 | Status: DISCONTINUED | OUTPATIENT
Start: 2024-02-04 | End: 2024-02-06

## 2024-02-04 RX ORDER — IBUPROFEN 200 MG
600 TABLET ORAL EVERY 6 HOURS
Refills: 0 | Status: COMPLETED | OUTPATIENT
Start: 2024-02-04 | End: 2025-01-02

## 2024-02-04 RX ORDER — DIBUCAINE 1 %
1 OINTMENT (GRAM) RECTAL EVERY 6 HOURS
Refills: 0 | Status: DISCONTINUED | OUTPATIENT
Start: 2024-02-04 | End: 2024-02-06

## 2024-02-04 RX ORDER — TETANUS TOXOID, REDUCED DIPHTHERIA TOXOID AND ACELLULAR PERTUSSIS VACCINE, ADSORBED 5; 2.5; 8; 8; 2.5 [IU]/.5ML; [IU]/.5ML; UG/.5ML; UG/.5ML; UG/.5ML
0.5 SUSPENSION INTRAMUSCULAR ONCE
Refills: 0 | Status: DISCONTINUED | OUTPATIENT
Start: 2024-02-04 | End: 2024-02-06

## 2024-02-04 RX ORDER — HYDROCORTISONE 1 %
1 OINTMENT (GRAM) TOPICAL EVERY 6 HOURS
Refills: 0 | Status: DISCONTINUED | OUTPATIENT
Start: 2024-02-04 | End: 2024-02-06

## 2024-02-04 RX ORDER — BENZOCAINE 10 %
1 GEL (GRAM) MUCOUS MEMBRANE EVERY 6 HOURS
Refills: 0 | Status: DISCONTINUED | OUTPATIENT
Start: 2024-02-04 | End: 2024-02-06

## 2024-02-04 RX ORDER — DIPHENHYDRAMINE HCL 50 MG
25 CAPSULE ORAL EVERY 6 HOURS
Refills: 0 | Status: DISCONTINUED | OUTPATIENT
Start: 2024-02-04 | End: 2024-02-06

## 2024-02-04 RX ORDER — SIMETHICONE 80 MG/1
80 TABLET, CHEWABLE ORAL EVERY 4 HOURS
Refills: 0 | Status: DISCONTINUED | OUTPATIENT
Start: 2024-02-04 | End: 2024-02-06

## 2024-02-04 RX ORDER — IBUPROFEN 200 MG
600 TABLET ORAL EVERY 6 HOURS
Refills: 0 | Status: DISCONTINUED | OUTPATIENT
Start: 2024-02-04 | End: 2024-02-06

## 2024-02-04 RX ORDER — SODIUM CHLORIDE 9 MG/ML
3 INJECTION INTRAMUSCULAR; INTRAVENOUS; SUBCUTANEOUS EVERY 8 HOURS
Refills: 0 | Status: DISCONTINUED | OUTPATIENT
Start: 2024-02-04 | End: 2024-02-06

## 2024-02-04 RX ORDER — KETOROLAC TROMETHAMINE 30 MG/ML
30 SYRINGE (ML) INJECTION ONCE
Refills: 0 | Status: DISCONTINUED | OUTPATIENT
Start: 2024-02-04 | End: 2024-02-04

## 2024-02-04 RX ORDER — LANOLIN
1 OINTMENT (GRAM) TOPICAL EVERY 6 HOURS
Refills: 0 | Status: DISCONTINUED | OUTPATIENT
Start: 2024-02-04 | End: 2024-02-06

## 2024-02-04 RX ORDER — OXYCODONE HYDROCHLORIDE 5 MG/1
5 TABLET ORAL
Refills: 0 | Status: DISCONTINUED | OUTPATIENT
Start: 2024-02-04 | End: 2024-02-06

## 2024-02-04 RX ORDER — OXYCODONE HYDROCHLORIDE 5 MG/1
5 TABLET ORAL ONCE
Refills: 0 | Status: DISCONTINUED | OUTPATIENT
Start: 2024-02-04 | End: 2024-02-06

## 2024-02-04 RX ORDER — SODIUM CHLORIDE 9 MG/ML
500 INJECTION, SOLUTION INTRAVENOUS ONCE
Refills: 0 | Status: COMPLETED | OUTPATIENT
Start: 2024-02-04 | End: 2024-02-04

## 2024-02-04 RX ADMIN — Medication 108 GRAM(S): at 03:34

## 2024-02-04 RX ADMIN — Medication 600 MILLIGRAM(S): at 20:30

## 2024-02-04 RX ADMIN — POLYETHYLENE GLYCOL 3350 17 GRAM(S): 17 POWDER, FOR SOLUTION ORAL at 17:06

## 2024-02-04 RX ADMIN — Medication 600 MILLIGRAM(S): at 16:01

## 2024-02-04 RX ADMIN — SERTRALINE 100 MILLIGRAM(S): 25 TABLET, FILM COATED ORAL at 09:54

## 2024-02-04 RX ADMIN — Medication 125 MILLIUNIT(S)/MIN: at 07:51

## 2024-02-04 RX ADMIN — Medication 30 MILLIGRAM(S): at 09:53

## 2024-02-04 RX ADMIN — FAMOTIDINE 20 MILLIGRAM(S): 10 INJECTION INTRAVENOUS at 09:54

## 2024-02-04 RX ADMIN — Medication 600 MILLIGRAM(S): at 16:35

## 2024-02-04 RX ADMIN — Medication 975 MILLIGRAM(S): at 18:33

## 2024-02-04 RX ADMIN — SODIUM CHLORIDE 1000 MILLILITER(S): 9 INJECTION, SOLUTION INTRAVENOUS at 01:56

## 2024-02-04 RX ADMIN — Medication 600 MILLIGRAM(S): at 21:00

## 2024-02-04 NOTE — OB NEONATOLOGY/PEDIATRICIAN DELIVERY SUMMARY - NSPEDSNEONOTESA_OBGYN_ALL_OB_FT
Requested by OB to attend this vaginal delivery at 40.2 weeks for meconium. Mother is a 33 year old,  , blood type  A pos.  Prenatal labs as follow: HIV neg, RPR non-reactive, rubella immune, HBsA neg, GBS pos on .  Maternal history significant for Anxiety/Depression, PCOS, misc x1, TOP x 1; on PNV, metformin 750mg, zoloft 100mg, diclegis prn, zofran prn, vitamin D, Pepcid, ASA. SROM at 23:55 with mec fluid 7 hours prior to delivery.  Infant emerged vertex with Poor tone. Delayed cord clamping X 30 sec, then brought to warmer. Physical exam WNL grossly, well perfuse, saturation in high 90's, 's at 8 min of life.  Dried, suctioned and stimulated. Apgars  7/9. Mom wishes to breast/bottle feed. Consents to hep B vaccine. Infant admitted to NBN for routine care. Parents updated.

## 2024-02-04 NOTE — OB RN DELIVERY SUMMARY - NS_SEPSISRSKCALC_OBGYN_ALL_OB_FT
EOS calculated successfully. EOS Risk Factor: 0.5/1000 live births ([facility] national incidence); GA=40w2d; Temp=98.6; ROM=7.617; GBS='Positive'; Antibiotics='GBS specific antibiotics > 2 hrs prior to birth'

## 2024-02-04 NOTE — OB PROVIDER LABOR PROGRESS NOTE - NS_SUBJECTIVE/OBJECTIVE_OBGYN_ALL_OB_FT
VE performed. Patient comfortable on epidural. Currently on pit of 4.
pt with srom   feel intermittent pressure

## 2024-02-04 NOTE — OB PROVIDER LABOR PROGRESS NOTE - ASSESSMENT
Cat 1 tracing  Continue titrate pitocin  Comfortable on epidural  Cont EFM/TOCO  Anticipate     Dr. Ward made aware  Nancy dEen, PGY-1
discussed with pt - exam unchanged will increase pitocin  peanut ball  discussed with pt ?meconium - possible need for peds at delivery    Esther Ward  OB attg

## 2024-02-04 NOTE — OB PROVIDER DELIVERY SUMMARY - NSPROVIDERDELIVERYNOTE_OBGYN_ALL_OB_FT
Pt presented in prodromal labor.  Progressed to FD.  Pushed to undergo .  Midline episiotomy cut with consent due to low FH.   baby boy, nuchal cord reduced at perineum.  Baby handed to waiting pediatricians for meconium.  Anal sphincter reinforced wth 2.0 vicryl and median episiotomy repaired with 2.0 vicryl rapide.    Rectal sphincter intact    Esther Ward  OB attg

## 2024-02-05 ENCOUNTER — TRANSCRIPTION ENCOUNTER (OUTPATIENT)
Age: 34
End: 2024-02-05

## 2024-02-05 DIAGNOSIS — F41.9 ANXIETY DISORDER, UNSPECIFIED: ICD-10-CM

## 2024-02-05 DIAGNOSIS — O48.0 POST-TERM PREGNANCY: ICD-10-CM

## 2024-02-05 DIAGNOSIS — O09.523 SUPERVISION OF ELDERLY MULTIGRAVIDA, THIRD TRIMESTER: ICD-10-CM

## 2024-02-05 DIAGNOSIS — F32.A DEPRESSION, UNSPECIFIED: ICD-10-CM

## 2024-02-05 DIAGNOSIS — Z3A.40 40 WEEKS GESTATION OF PREGNANCY: ICD-10-CM

## 2024-02-05 DIAGNOSIS — O99.343 OTHER MENTAL DISORDERS COMPLICATING PREGNANCY, THIRD TRIMESTER: ICD-10-CM

## 2024-02-05 DIAGNOSIS — O99.283 ENDOCRINE, NUTRITIONAL AND METABOLIC DISEASES COMPLICATING PREGNANCY, THIRD TRIMESTER: ICD-10-CM

## 2024-02-05 DIAGNOSIS — E03.9 HYPOTHYROIDISM, UNSPECIFIED: ICD-10-CM

## 2024-02-05 DIAGNOSIS — O09.293 SUPERVISION OF PREGNANCY WITH OTHER POOR REPRODUCTIVE OR OBSTETRIC HISTORY, THIRD TRIMESTER: ICD-10-CM

## 2024-02-05 DIAGNOSIS — O47.1 FALSE LABOR AT OR AFTER 37 COMPLETED WEEKS OF GESTATION: ICD-10-CM

## 2024-02-05 DIAGNOSIS — O09.13 SUPERVISION OF PREGNANCY WITH HISTORY OF ECTOPIC PREGNANCY, THIRD TRIMESTER: ICD-10-CM

## 2024-02-05 RX ORDER — METFORMIN HYDROCHLORIDE 850 MG/1
1 TABLET ORAL
Refills: 0 | DISCHARGE

## 2024-02-05 RX ORDER — FAMOTIDINE 10 MG/ML
1 INJECTION INTRAVENOUS
Refills: 0 | DISCHARGE

## 2024-02-05 RX ORDER — SERTRALINE 25 MG/1
1 TABLET, FILM COATED ORAL
Refills: 0 | DISCHARGE

## 2024-02-05 RX ORDER — IBUPROFEN 200 MG
1 TABLET ORAL
Qty: 0 | Refills: 0 | DISCHARGE
Start: 2024-02-05

## 2024-02-05 RX ORDER — ACETAMINOPHEN 500 MG
3 TABLET ORAL
Qty: 0 | Refills: 0 | DISCHARGE
Start: 2024-02-05

## 2024-02-05 RX ADMIN — Medication 975 MILLIGRAM(S): at 00:50

## 2024-02-05 RX ADMIN — Medication 600 MILLIGRAM(S): at 10:40

## 2024-02-05 RX ADMIN — Medication 600 MILLIGRAM(S): at 15:57

## 2024-02-05 RX ADMIN — Medication 600 MILLIGRAM(S): at 21:50

## 2024-02-05 RX ADMIN — Medication 600 MILLIGRAM(S): at 21:18

## 2024-02-05 RX ADMIN — METFORMIN HYDROCHLORIDE 750 MILLIGRAM(S): 850 TABLET ORAL at 21:17

## 2024-02-05 RX ADMIN — Medication 600 MILLIGRAM(S): at 10:07

## 2024-02-05 RX ADMIN — FAMOTIDINE 20 MILLIGRAM(S): 10 INJECTION INTRAVENOUS at 13:37

## 2024-02-05 RX ADMIN — Medication 975 MILLIGRAM(S): at 01:25

## 2024-02-05 RX ADMIN — SERTRALINE 100 MILLIGRAM(S): 25 TABLET, FILM COATED ORAL at 13:36

## 2024-02-05 RX ADMIN — Medication 600 MILLIGRAM(S): at 16:15

## 2024-02-05 NOTE — DISCHARGE NOTE OB - PATIENT PORTAL LINK FT
You can access the FollowMyHealth Patient Portal offered by Health system by registering at the following website: [email or web address]. By joining [facility]’s FollowMyHealth portal, you will also be able to view your health information using other applications (apps) compatible with our system.

## 2024-02-05 NOTE — DISCHARGE NOTE OB - AVOID PROLONGED STANDING
Advised Cori that she will need to contact her insurance prescription plan to see what oral diabetes medications they cover and let us know so that Dr Jones can prescribe a new medication. Cori verbalizes understanding.    Statement Selected

## 2024-02-05 NOTE — DISCHARGE NOTE OB - CARE PLAN
Principal Discharge DX:	 (normal spontaneous vaginal delivery)  Assessment and plan of treatment:	32 yo P1 s/p   normal PP course  stable for discharge   1

## 2024-02-05 NOTE — DISCHARGE NOTE OB - CARE PROVIDER_API CALL
Esther Ward  Obstetrics and Gynecology  [address] [place] [address], [address]  [place] [address]  Phone: [phone]  Fax: [phone]  Follow Up Time:

## 2024-02-05 NOTE — DISCHARGE NOTE OB - MEDICATION SUMMARY - MEDICATIONS TO TAKE
I will START or STAY ON the medications listed below when I get home from the hospital:    ibuprofen 600 mg oral tablet  -- 1 tab(s) by mouth every 6 hours  -- Indication: For 40 weeks gestation of pregnancy    acetaminophen 325 mg oral tablet  -- 3 tab(s) by mouth every 6 hours  -- Indication: For 40 weeks gestation of pregnancy

## 2024-02-05 NOTE — PROGRESS NOTE ADULT - NS PANP COMMENT GEN_ALL_CORE FT
I agree with the resident's note above.    Patient is doing well. Pain is well controlled. Tolerating regular diet, ambulating, and voiding.    34 yo P1 s/p   normal PP course    Plan:  Reg diet  Ambulating  Pain control  Routine postpartum care    tashia moyer

## 2024-02-05 NOTE — DISCHARGE NOTE OB - NS MD DC FALL RISK RISK
For information on Fall & Injury Prevention, visit: [email or web address] OR  [email or web address] OR  https://www.cdc.gov/steadi/patient.html

## 2024-02-06 VITALS
SYSTOLIC BLOOD PRESSURE: 122 MMHG | HEART RATE: 69 BPM | RESPIRATION RATE: 18 BRPM | OXYGEN SATURATION: 98 % | TEMPERATURE: 98 F | DIASTOLIC BLOOD PRESSURE: 80 MMHG

## 2024-02-06 PROCEDURE — 86850 RBC ANTIBODY SCREEN: CPT

## 2024-02-06 PROCEDURE — 36415 COLL VENOUS BLD VENIPUNCTURE: CPT

## 2024-02-06 PROCEDURE — 86901 BLOOD TYPING SEROLOGIC RH(D): CPT

## 2024-02-06 PROCEDURE — 59050 FETAL MONITOR W/REPORT: CPT

## 2024-02-06 PROCEDURE — 86900 BLOOD TYPING SEROLOGIC ABO: CPT

## 2024-02-06 PROCEDURE — 86780 TREPONEMA PALLIDUM: CPT

## 2024-02-06 PROCEDURE — 85025 COMPLETE CBC W/AUTO DIFF WBC: CPT

## 2024-02-06 RX ADMIN — FAMOTIDINE 20 MILLIGRAM(S): 10 INJECTION INTRAVENOUS at 11:58

## 2024-02-06 RX ADMIN — Medication 600 MILLIGRAM(S): at 06:30

## 2024-02-06 RX ADMIN — SERTRALINE 100 MILLIGRAM(S): 25 TABLET, FILM COATED ORAL at 08:54

## 2024-02-06 RX ADMIN — Medication 600 MILLIGRAM(S): at 05:58

## 2024-02-06 RX ADMIN — Medication 600 MILLIGRAM(S): at 11:57

## 2024-02-06 NOTE — PROGRESS NOTE ADULT - SUBJECTIVE AND OBJECTIVE BOX
S: Patient is doing well without complaints. Tolerates regular diet. She states lochia is in WNL. Ambulating without difficulty. Denies N/V. Voiding freely. Passing flatus. Pain well controlled with oral pain medications. Denies any HA/vision changes, CP/SOB, F/C/S.    O: Vital Signs Last 24 Hrs  T(C): 36.6 (06 Feb 2024 06:43), Max: 36.8 (05 Feb 2024 09:21)  T(F): 97.8 (06 Feb 2024 06:43), Max: 98.2 (05 Feb 2024 09:21)  HR: 69 (06 Feb 2024 06:43) (69 - 79)  BP: 122/80 (06 Feb 2024 06:43) (107/71 - 122/80)  BP(mean): --  RR: 18 (06 Feb 2024 06:43) (18 - 18)  SpO2: 98% (06 Feb 2024 06:43) (95% - 100%)    Parameters below as of 06 Feb 2024 06:43  Patient On (Oxygen Delivery Method): room air        Physical Exam:  General: NAD  Abdomen: soft, non-tender, non-distended, fundus firm  Vaginal: deferred  Ext: NTBL    Labs:                MEDICATIONS  (STANDING):  acetaminophen     Tablet .. 975 milliGRAM(s) Oral <User Schedule>  diphtheria/tetanus/pertussis (acellular) Vaccine (Adacel) 0.5 milliLiter(s) IntraMuscular once  famotidine    Tablet 20 milliGRAM(s) Oral daily  ibuprofen  Tablet. 600 milliGRAM(s) Oral every 6 hours  metFORMIN 750 milliGRAM(s) Oral at bedtime  oxytocin Infusion 333.333 milliUNIT(s)/Min (1000 mL/Hr) IV Continuous <Continuous>  oxytocin Infusion 41.667 milliUNIT(s)/Min (125 mL/Hr) IV Continuous <Continuous>  oxytocin Infusion. 2 milliUNIT(s)/Min (2 mL/Hr) IV Continuous <Continuous>  prenatal multivitamin 1 Tablet(s) Oral daily  sertraline 100 milliGRAM(s) Oral daily  sodium chloride 0.9% lock flush 3 milliLiter(s) IV Push every 8 hours    
OB Progress Note:  PPD#1    S: 34yo  PPD#1 s/p . Patient feels well. Pain is well controlled. She is tolerating a regular diet and passing flatus. She is voiding spontaneously, and ambulating without difficulty. Denies CP/SOB. Denies lightheadedness/dizziness. Denies N/V.    O:  Vitals:  Vital Signs Last 24 Hrs  T(C): 36.6 (2024 21:18), Max: 36.8 (2024 10:16)  T(F): 97.8 (2024 21:18), Max: 98.2 (2024 10:16)  HR: 103 (2024 21:18) (65 - 103)  BP: 98/64 (2024 21:18) (82/49 - 144/70)  BP(mean): --  RR: 18 (2024 21:18) (18 - 18)  SpO2: 97% (:18) (83% - 100%)    Parameters below as of 2024 11:55  Patient On (Oxygen Delivery Method): room air        MEDICATIONS  (STANDING):  acetaminophen     Tablet .. 975 milliGRAM(s) Oral <User Schedule>  diphtheria/tetanus/pertussis (acellular) Vaccine (Adacel) 0.5 milliLiter(s) IntraMuscular once  famotidine    Tablet 20 milliGRAM(s) Oral daily  ibuprofen  Tablet. 600 milliGRAM(s) Oral every 6 hours  metFORMIN 750 milliGRAM(s) Oral at bedtime  oxytocin Infusion 333.333 milliUNIT(s)/Min (1000 mL/Hr) IV Continuous <Continuous>  oxytocin Infusion 41.667 milliUNIT(s)/Min (125 mL/Hr) IV Continuous <Continuous>  oxytocin Infusion. 2 milliUNIT(s)/Min (2 mL/Hr) IV Continuous <Continuous>  prenatal multivitamin 1 Tablet(s) Oral daily  sertraline 100 milliGRAM(s) Oral daily  sodium chloride 0.9% lock flush 3 milliLiter(s) IV Push every 8 hours      Labs:  Blood type: A Positive  Rubella IgG: RPR: Negative                          10.9<L>   9.71 >-----------< 282    (  @ 17:37 )             35.2            Physical Exam:  General: NAD  Abdomen: soft, non-tender, non-distended, fundus firm  Vaginal: Lochia wnl  Extremities: No erythema/edema

## 2024-02-06 NOTE — PROGRESS NOTE ADULT - ASSESSMENT
A/P: 34yo PPD#1 s/p .  Patient is stable and doing well post-partum.   - Pain well controlled, continue current pain regimen  - Increase ambulation, SCDs when not ambulating  - Continue regular diet    Shira Mauro PGY1
A/P:  PPD2 stable for d/c    po pain meds  ambulate  reg diet    Esther Ward  OB attg

## 2024-02-29 NOTE — ASU DISCHARGE PLAN (ADULT/PEDIATRIC) - B. DRINK ALCOHOL, BEER, OR WINE
If you take an anti-seizure medication, then take that medication as previously indicated and prescribed.  Do not miss any doses.    Do not drive any vehicles or operate any heavy machinery for a period of 6 months after having a seizure.  If you are caught driving and have had a seizure, then you could possible go to [facility].    PLEASE RETURN TO THE EMERGENCY DEPARTMENT IMMEDIATELY for worsening symptoms, any seizure lasting for more than 5 minutes,  having multiple seizures in a row,  or if you develop any concerning symptoms such as: high fever not relieved by acetaminophen (Tylenol) and/or ibuprofen (Motrin / Advil), chills, shortness of breath, chest pain, feeling of your heart fluttering or racing, persistent nausea and/or vomiting, vomiting up blood, blood in your stool, loss of consciousness, numbness, weakness or tingling in the arms or legs or change in color of the extremities, changes in mental status, persistent headache, blurry vision loss of bladder / bowel control, unable to follow up with your physician, or other any other care or concern.  
Statement Selected

## 2024-03-15 ENCOUNTER — APPOINTMENT (OUTPATIENT)
Dept: OBGYN | Facility: CLINIC | Age: 34
End: 2024-03-15
Payer: COMMERCIAL

## 2024-03-15 PROCEDURE — 96127 BRIEF EMOTIONAL/BEHAV ASSMT: CPT

## 2024-03-15 PROCEDURE — 0503F POSTPARTUM CARE VISIT: CPT

## 2024-09-13 ENCOUNTER — APPOINTMENT (OUTPATIENT)
Dept: HUMAN REPRODUCTION | Facility: CLINIC | Age: 34
End: 2024-09-13

## 2024-09-13 PROCEDURE — 99214 OFFICE O/P EST MOD 30 MIN: CPT | Mod: NC

## 2024-09-13 PROCEDURE — 36415 COLL VENOUS BLD VENIPUNCTURE: CPT | Mod: NC

## 2024-09-13 PROCEDURE — 99214P: CUSTOM

## 2025-03-19 ENCOUNTER — NON-APPOINTMENT (OUTPATIENT)
Age: 35
End: 2025-03-19

## 2025-03-20 ENCOUNTER — APPOINTMENT (OUTPATIENT)
Dept: HUMAN REPRODUCTION | Facility: CLINIC | Age: 35
End: 2025-03-20
Payer: COMMERCIAL

## 2025-03-20 PROCEDURE — 58974P52: CUSTOM | Mod: 52

## 2025-03-20 PROCEDURE — 76831P: CUSTOM

## 2025-05-02 ENCOUNTER — APPOINTMENT (OUTPATIENT)
Dept: OBGYN | Facility: CLINIC | Age: 35
End: 2025-05-02
Payer: COMMERCIAL

## 2025-05-02 PROCEDURE — 36415 COLL VENOUS BLD VENIPUNCTURE: CPT

## 2025-05-02 PROCEDURE — 99214 OFFICE O/P EST MOD 30 MIN: CPT | Mod: 25

## 2025-05-02 PROCEDURE — 76817 TRANSVAGINAL US OBSTETRIC: CPT

## 2025-05-07 ENCOUNTER — APPOINTMENT (OUTPATIENT)
Dept: OBGYN | Facility: HOSPITAL | Age: 35
End: 2025-05-07

## 2025-05-07 ENCOUNTER — OUTPATIENT (OUTPATIENT)
Dept: OUTPATIENT SERVICES | Facility: HOSPITAL | Age: 35
LOS: 1 days | End: 2025-05-07
Payer: COMMERCIAL

## 2025-05-07 ENCOUNTER — TRANSCRIPTION ENCOUNTER (OUTPATIENT)
Age: 35
End: 2025-05-07

## 2025-05-07 VITALS
DIASTOLIC BLOOD PRESSURE: 55 MMHG | HEART RATE: 62 BPM | SYSTOLIC BLOOD PRESSURE: 94 MMHG | OXYGEN SATURATION: 99 % | RESPIRATION RATE: 18 BRPM

## 2025-05-07 VITALS
HEART RATE: 73 BPM | OXYGEN SATURATION: 97 % | SYSTOLIC BLOOD PRESSURE: 99 MMHG | HEIGHT: 62 IN | DIASTOLIC BLOOD PRESSURE: 67 MMHG | TEMPERATURE: 98 F | RESPIRATION RATE: 15 BRPM | WEIGHT: 190.04 LBS

## 2025-05-07 DIAGNOSIS — O02.1 MISSED ABORTION: ICD-10-CM

## 2025-05-07 DIAGNOSIS — Z90.89 ACQUIRED ABSENCE OF OTHER ORGANS: Chronic | ICD-10-CM

## 2025-05-07 LAB
BLD GP AB SCN SERPL QL: NEGATIVE — SIGNIFICANT CHANGE UP
RH IG SCN BLD-IMP: POSITIVE — SIGNIFICANT CHANGE UP

## 2025-05-07 PROCEDURE — 88280 CHROMOSOME KARYOTYPE STUDY: CPT

## 2025-05-07 PROCEDURE — 88233 TISSUE CULTURE SKIN/BIOPSY: CPT

## 2025-05-07 PROCEDURE — 59820 CARE OF MISCARRIAGE: CPT

## 2025-05-07 PROCEDURE — 88291 CYTO/MOLECULAR REPORT: CPT

## 2025-05-07 PROCEDURE — 88264 CHROMOSOME ANALYSIS 20-25: CPT

## 2025-05-07 PROCEDURE — 88305 TISSUE EXAM BY PATHOLOGIST: CPT | Mod: 26

## 2025-05-07 PROCEDURE — 86850 RBC ANTIBODY SCREEN: CPT

## 2025-05-07 PROCEDURE — 88305 TISSUE EXAM BY PATHOLOGIST: CPT

## 2025-05-07 PROCEDURE — 86901 BLOOD TYPING SEROLOGIC RH(D): CPT

## 2025-05-07 PROCEDURE — 86900 BLOOD TYPING SEROLOGIC ABO: CPT

## 2025-05-07 RX ORDER — OXYCODONE HYDROCHLORIDE 30 MG/1
5 TABLET ORAL ONCE
Refills: 0 | Status: DISCONTINUED | OUTPATIENT
Start: 2025-05-07 | End: 2025-05-07

## 2025-05-07 RX ORDER — HYDROMORPHONE/SOD CHLOR,ISO/PF 2 MG/10 ML
0.5 SYRINGE (ML) INJECTION ONCE
Refills: 0 | Status: DISCONTINUED | OUTPATIENT
Start: 2025-05-07 | End: 2025-05-07

## 2025-05-07 RX ORDER — ONDANSETRON HCL/PF 4 MG/2 ML
4 VIAL (ML) INJECTION ONCE
Refills: 0 | Status: DISCONTINUED | OUTPATIENT
Start: 2025-05-07 | End: 2025-05-21

## 2025-05-07 NOTE — ASU PATIENT PROFILE, ADULT - TOBACCO USE
Quality 402: Tobacco Use And Help With Quitting Among Adolescents: Patient screened for tobacco and never smoked Detail Level: Detailed Quality 130: Documentation Of Current Medications In The Medical Record: Current Medications Documented Quality 110: Preventive Care And Screening: Influenza Immunization: Influenza Immunization previously received during influenza season Quality 431: Preventive Care And Screening: Unhealthy Alcohol Use - Screening: Patient screened for unhealthy alcohol use using a single question and scores less than 2 times per year Quality 431: Preventive Care And Screening: Unhealthy Alcohol Use - Screening: Patient not identified as an unhealthy alcohol user when screened for unhealthy alcohol use using a systematic screening method Never smoker

## 2025-05-07 NOTE — BRIEF OPERATIVE NOTE - NSICDXBRIEFPROCEDURE_GEN_ALL_CORE_FT
PROCEDURES:  Dilation and curettage, uterus, using suction, for missed first trimester  07-May-2025 09:07:55  Fuentes Vera

## 2025-05-07 NOTE — ASU PATIENT PROFILE, ADULT - HOW PATIENT ADDRESSED, PROFILE
[Home] : at home, [unfilled] , at the time of the visit. [Medical Office: ([facility])___] : at the medical office located in  [Verbal consent obtained from patient] : the patient, [unfilled] [Frequent Nocturnal Awakening] : frequent nocturnal awakening [Daytime Somnolence] : daytime somnolence [Unintentional Sleep While Inactive] : unintentional sleep while inactive [Awakes Unrefreshed] : awakening unrefreshed [AHI: ___ per hour] : Apnea-hypopnea index:  [unfilled] per hour Carlos [T90%: ___] : T90%: [unfilled]% [Silvino desatuation%: ___] : Silvino desaturation:  [unfilled]% [CPAP: ___ cmH2O] : CPAP: [unfilled] cmH2O [% Days used: ____] : Days used: [unfilled] % [% Days used > 4 hrs: ____] : Days used > 4 hrs: [unfilled] % [Mean nightly usage: ___ hrs] : Mean nightly usage: [unfilled] hrs [Therapy based AHI: ___ /hr] : Therapy based AHI: [unfilled] / hr [ESS: ___] : ESS score [unfilled] [To Bed: ___] : ~he/she~ goes to bed at [unfilled] [Sleep Onset Latency: ___ minutes] : sleep onset latency of [unfilled] minutes reported [Arises: ___] : arises at [unfilled] [Nocturnal Awakenings: ___] : ~he/she~ typically has [unfilled] nocturnal awakenings [WASO: ___] : Wake time after sleep onset is [unfilled] [Daytime Sleep: ___] : daytime sleep: [unfilled] [Date: ___] : the most recent therapeutic polysomnogram was completed [unfilled] [TST: ___] : Total sleep time is [unfilled] [FreeTextEntry1] : 44 year old JOHN FALCON with mild symptomatic obstructive sleep apnea, presents for follow-up visit after initiating APAP therapy in July 2020.\par \par PMH:  Hypothyroidism, obesity, prediabetes, GERD, eczema.\par .\par The patient was diagnosed with OH several years ago in [place] without follow-up until February 2020, when she was evaluated at our center for daytime sleepiness (ESS 16) and frequent nocturnal awakenings.  Home diagnostic sleep study in June 2020 reported mild OH (AHI of 6.3).  \par \par CHIEF COMPLAINT:  still waking up through the night although less often.  The patient reports that she expected better results (from APAP therapy).\par \par With nightly APAP use for 3-6 hours since July 2020, the patient notes improvement in sleepiness (ESS of 16 reduced to ESS of 9) and nocturnal awakenings (3-4 times reduced to 1-2 times).  The pressures at 4-20 cmH2O are well tolerated.  Patient would prefer a nasal mask - when set-up was told that they did not have any nasal masks at the time, so they provided her with a full-face mask.  Her mask leaks air into her eyes at times; sore teeth and nose itch she attributes to the mask.\par \par SLEEP:  Later sleep schedule for the past month due to work schedule.  Sleeps 6 hours for <=3 hours at a time,  with bedtime at 1-3AM, leaves the bed at 6-8:30AM.  She takes a rare slightly refreshing 1-1.5 hour nap once per week at ~1PM.  She dozes off for "2-seconds" while working or during her break.  She watches TV in bed at bedtime, and gazes at the clock during nocturnal awakenings.. 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    \par This refers to your usual way of life in recent times.  \par Even if you haven't done some of these things recently, try to work out how they would have affected you.\par Use the following scale to choose one most appropriate number for each situation.\par \par 0 = never would doze\par 1 = slight chance of dozing\par 2 = moderate chance of dozing\par 3 = high chance of dozing\par \par Chance of dozing............Situation\par 1........................................Sitting and reading\par 1........................................Watching TV\par 1........................................Sitting inactive in a public place (eg a theatre or a meeting)\par 0.........................................As a passenger in a car for an hour without a break\par 3........................................Lying down to rest in the afternoon when circumstances permit\par 0........................................Sitting and talking to someone\par 3........................................Sitting quietly after lunch without alcohol\par 0........................................In a car, while stopped for a few minutes in traffic\par \par 9........................................TOTAL ESS SCORE\par _________________________________________________________________________________ [Snoring] : no snoring [Witnessed Apneas] : no witnessed sleep apnea [Unintentional Sleep while Active] : no unintentional sleep while active [Awakes with Headache] : no headache upon awakening [Awakening With Dry Mouth] : no dry mouth upon awakening [DIS] : no DIS [Recent  Weight Gain] : no recent weight gain [DMS] : no DMS [Unusual Sleep Behavior] : no unusual sleep behavior [Lower Extremity Discomfort] : no lower extremity discomfort in evening or at bedtime [Nocturnal Oxygen] : The patient does not use nocturnal oxygen

## 2025-05-07 NOTE — H&P ADULT - NSHPPOAPULMEMBOLUS_GEN_A_CORE
no CTA chest: Mild smooth interlobular septal thickening likely reflective of mild interstitial edema. Trace bilateral pleural effusions (left greater than right). Cardiomegaly with reflux of contrast into the intrahepatic IVC and hepatic veins suggestive of right heart failure.  TTE 2/2025: There is increased LV mass and concentric hypertrophy. There is mild (grade 1) left ventricular diastolic dysfunction.  No CP or palpitations   Received 3L NS in ED    -Telemetry   -Hold IV fluids   -Started Lasix 20mg IVP daily x 2 days then re-evaluate (primary team)   -Strict I/O q4h  -Deily weight   -f/u ProBNP and screening Trop   -Cardiology c/s: Dr. Alexander in AM

## 2025-05-07 NOTE — ASU DISCHARGE PLAN (ADULT/PEDIATRIC) - CARE PROVIDER_API CALL
Fuentes Vera  Obstetrics and Gynecology  [address] [place] [address], [address]  [place] [address]  Phone: [phone]  Fax: [phone]  Follow Up Time:

## 2025-05-07 NOTE — ASU DISCHARGE PLAN (ADULT/PEDIATRIC) - FINANCIAL ASSISTANCE
[facility] provides services at a reduced cost to those who are determined to be eligible through [facility]’s financial assistance program. Information regarding [facility]’s financial assistance program can be found by going to [email or web address] or by calling [phone].

## 2025-05-07 NOTE — ASU PATIENT PROFILE, ADULT - FALL HARM RISK - UNIVERSAL INTERVENTIONS
Bed in lowest position, wheels locked, appropriate side rails in place/Call bell, personal items and telephone in reach/Instruct patient to call for assistance before getting out of bed or chair/Non-slip footwear when patient is out of bed/[place] to call system/Physically safe environment - no spills, clutter or unnecessary equipment/Purposeful Proactive Rounding/Room/bathroom lighting operational, light cord in reach

## 2025-05-07 NOTE — H&P ADULT - HISTORY OF PRESENT ILLNESS
35 year old  presents with MAB at 8 weeks for D&C    ObHx:  x1, MAB s/p D&C x1, TOP x1  MedHx: denies  SurgHx: D&C, tonsillectomy, eye surgery  GynHx: h/o PCOS; denies fibroids, cysts, abnormal paps, STIs  SocialHx: denies ETOH, tobacco, drug use  PsychHx: anxiety/depression  All: NKDA, NKEA, NFA  Meds: PNV

## 2025-05-07 NOTE — ASU DISCHARGE PLAN (ADULT/PEDIATRIC) - NURSING INSTRUCTIONS
You can take Tylenol (Acetaminophen) every 6 hours and Motrin (Ibuprofen) every 6 hours, as needed. You may alternate these medications such that you take one or the other every 3 hours for around the clock pain coverage. Do not exceed 4000mg of Tylenol (Acetaminophen) daily. The next time you can take Tylenol and/or Ibuprofen is at 3:30PM, if needed for pain and no contraindications.

## 2025-05-07 NOTE — ASU DISCHARGE PLAN (ADULT/PEDIATRIC) - NS MD DC FALL RISK RISK
For information on Fall & Injury Prevention, visit: [email or web address] OR  [email or web address] OR  https://www.cdc.gov/steadi/patient.html

## 2025-05-15 LAB — SURGICAL PATHOLOGY STUDY: SIGNIFICANT CHANGE UP

## 2025-05-21 ENCOUNTER — APPOINTMENT (OUTPATIENT)
Dept: OBGYN | Facility: CLINIC | Age: 35
End: 2025-05-21
Payer: COMMERCIAL

## 2025-05-21 PROCEDURE — 99024 POSTOP FOLLOW-UP VISIT: CPT

## 2025-06-19 ENCOUNTER — APPOINTMENT (OUTPATIENT)
Dept: HUMAN REPRODUCTION | Facility: CLINIC | Age: 35
End: 2025-06-19
Payer: COMMERCIAL

## 2025-06-19 PROCEDURE — 99214 OFFICE O/P EST MOD 30 MIN: CPT | Mod: NC

## 2025-06-19 PROCEDURE — 36415 COLL VENOUS BLD VENIPUNCTURE: CPT | Mod: NC

## 2025-06-19 PROCEDURE — 76831P: CUSTOM

## 2025-06-19 PROCEDURE — 58974 EMBRYO TRANSFER INTRAUTERINE: CPT | Mod: 52

## 2025-06-27 ENCOUNTER — APPOINTMENT (OUTPATIENT)
Dept: HUMAN REPRODUCTION | Facility: CLINIC | Age: 35
End: 2025-06-27
Payer: COMMERCIAL

## 2025-06-27 PROCEDURE — 76857 US EXAM PELVIC LIMITED: CPT | Mod: NC

## 2025-06-27 PROCEDURE — 36415 COLL VENOUS BLD VENIPUNCTURE: CPT | Mod: NC

## 2025-06-27 PROCEDURE — 99213 OFFICE O/P EST LOW 20 MIN: CPT | Mod: NC

## 2025-07-01 ENCOUNTER — APPOINTMENT (OUTPATIENT)
Dept: HUMAN REPRODUCTION | Facility: CLINIC | Age: 35
End: 2025-07-01
Payer: COMMERCIAL

## 2025-07-01 PROCEDURE — 76857 US EXAM PELVIC LIMITED: CPT | Mod: NC

## 2025-07-01 PROCEDURE — 99213 OFFICE O/P EST LOW 20 MIN: CPT | Mod: NC

## 2025-07-15 ENCOUNTER — APPOINTMENT (OUTPATIENT)
Dept: SURGERY | Facility: CLINIC | Age: 35
End: 2025-07-15
Payer: COMMERCIAL

## 2025-07-15 VITALS
HEART RATE: 79 BPM | OXYGEN SATURATION: 99 % | SYSTOLIC BLOOD PRESSURE: 104 MMHG | HEIGHT: 62 IN | WEIGHT: 198 LBS | TEMPERATURE: 96.9 F | DIASTOLIC BLOOD PRESSURE: 68 MMHG | BODY MASS INDEX: 36.44 KG/M2

## 2025-07-15 PROBLEM — Z82.49 FAMILY HISTORY OF CARDIAC DISORDER: Status: ACTIVE | Noted: 2025-07-15

## 2025-07-15 PROBLEM — Z78.9 SOCIAL ALCOHOL USE: Status: ACTIVE | Noted: 2025-07-15

## 2025-07-15 PROBLEM — Z78.9 NON-SMOKER: Status: ACTIVE | Noted: 2025-07-15

## 2025-07-15 PROBLEM — Z83.438 FAMILY HISTORY OF HYPERLIPIDEMIA: Status: ACTIVE | Noted: 2025-07-15

## 2025-07-15 PROCEDURE — 99204 OFFICE O/P NEW MOD 45 MIN: CPT

## 2025-07-15 RX ORDER — SERTRALINE HYDROCHLORIDE 50 MG/1
50 TABLET, FILM COATED ORAL
Refills: 0 | Status: ACTIVE | COMMUNITY

## 2025-07-15 RX ORDER — CHROMIUM 200 MCG
TABLET ORAL
Refills: 0 | Status: ACTIVE | COMMUNITY

## 2025-07-15 RX ORDER — OMEGA-3/DHA/EPA/FISH OIL 300-1000MG
CAPSULE ORAL
Refills: 0 | Status: ACTIVE | COMMUNITY

## 2025-07-15 RX ORDER — SERTRALINE HYDROCHLORIDE 100 MG/1
100 TABLET, FILM COATED ORAL
Refills: 0 | Status: ACTIVE | COMMUNITY

## 2025-07-15 RX ORDER — CHOLINE 650 MG
TABLET ORAL
Refills: 0 | Status: ACTIVE | COMMUNITY

## 2025-07-17 ENCOUNTER — APPOINTMENT (OUTPATIENT)
Dept: HUMAN REPRODUCTION | Facility: CLINIC | Age: 35
End: 2025-07-17

## 2025-07-18 ENCOUNTER — TRANSCRIPTION ENCOUNTER (OUTPATIENT)
Age: 35
End: 2025-07-18

## 2025-07-18 ENCOUNTER — RESULT REVIEW (OUTPATIENT)
Age: 35
End: 2025-07-18

## 2025-07-18 ENCOUNTER — APPOINTMENT (OUTPATIENT)
Dept: SURGERY | Facility: HOSPITAL | Age: 35
End: 2025-07-18

## 2025-07-18 ENCOUNTER — OUTPATIENT (OUTPATIENT)
Dept: OUTPATIENT SERVICES | Facility: HOSPITAL | Age: 35
LOS: 1 days | End: 2025-07-18
Payer: COMMERCIAL

## 2025-07-18 VITALS
TEMPERATURE: 98 F | HEART RATE: 70 BPM | OXYGEN SATURATION: 96 % | RESPIRATION RATE: 16 BRPM | DIASTOLIC BLOOD PRESSURE: 79 MMHG | SYSTOLIC BLOOD PRESSURE: 118 MMHG

## 2025-07-18 VITALS
HEART RATE: 84 BPM | TEMPERATURE: 98 F | HEIGHT: 62 IN | RESPIRATION RATE: 17 BRPM | WEIGHT: 197.98 LBS | DIASTOLIC BLOOD PRESSURE: 70 MMHG | SYSTOLIC BLOOD PRESSURE: 107 MMHG | OXYGEN SATURATION: 97 %

## 2025-07-18 DIAGNOSIS — Z90.89 ACQUIRED ABSENCE OF OTHER ORGANS: Chronic | ICD-10-CM

## 2025-07-18 DIAGNOSIS — Z98.890 OTHER SPECIFIED POSTPROCEDURAL STATES: Chronic | ICD-10-CM

## 2025-07-18 DIAGNOSIS — K80.20 CALCULUS OF GALLBLADDER WITHOUT CHOLECYSTITIS WITHOUT OBSTRUCTION: ICD-10-CM

## 2025-07-18 LAB — HCG UR QL: NEGATIVE — SIGNIFICANT CHANGE UP

## 2025-07-18 PROCEDURE — 47562 LAPAROSCOPIC CHOLECYSTECTOMY: CPT | Mod: AS

## 2025-07-18 PROCEDURE — 88304 TISSUE EXAM BY PATHOLOGIST: CPT | Mod: 26

## 2025-07-18 PROCEDURE — 47562 LAPAROSCOPIC CHOLECYSTECTOMY: CPT

## 2025-07-18 DEVICE — CLIP APPLIER COVIDIEN ENDOCLIP III 5MM: Type: IMPLANTABLE DEVICE | Site: ABDOMINAL | Status: FUNCTIONAL

## 2025-07-18 RX ORDER — OXYCODONE HYDROCHLORIDE 30 MG/1
1 TABLET ORAL
Qty: 12 | Refills: 0
Start: 2025-07-18 | End: 2025-07-20

## 2025-07-18 RX ORDER — FENTANYL CITRATE-0.9 % NACL/PF 100MCG/2ML
25 SYRINGE (ML) INTRAVENOUS
Refills: 0 | Status: DISCONTINUED | OUTPATIENT
Start: 2025-07-18 | End: 2025-07-20

## 2025-07-18 RX ORDER — FENTANYL CITRATE-0.9 % NACL/PF 100MCG/2ML
50 SYRINGE (ML) INTRAVENOUS
Refills: 0 | Status: DISCONTINUED | OUTPATIENT
Start: 2025-07-18 | End: 2025-07-20

## 2025-07-18 RX ORDER — ACETAMINOPHEN 500 MG/5ML
2 LIQUID (ML) ORAL
Qty: 0 | Refills: 0 | DISCHARGE

## 2025-07-18 RX ORDER — SODIUM CHLORIDE 9 G/1000ML
1000 INJECTION, SOLUTION INTRAVENOUS
Refills: 0 | Status: DISCONTINUED | OUTPATIENT
Start: 2025-07-18 | End: 2025-07-20

## 2025-07-18 RX ORDER — IBUPROFEN 200 MG
1 TABLET ORAL
Qty: 0 | Refills: 0 | DISCHARGE

## 2025-07-18 RX ORDER — MAGNESIUM ASPARTATE HCL 61 MG(615)
0 TABLET, DELAYED RELEASE (ENTERIC COATED) ORAL
Refills: 0 | DISCHARGE

## 2025-07-18 RX ORDER — APREPITANT 40 MG/1
40 CAPSULE ORAL ONCE
Refills: 0 | Status: COMPLETED | OUTPATIENT
Start: 2025-07-18 | End: 2025-07-18

## 2025-07-18 RX ORDER — METFORMIN HYDROCHLORIDE 850 MG/1
0 TABLET ORAL
Refills: 0 | DISCHARGE

## 2025-07-18 RX ORDER — ONDANSETRON HCL/PF 4 MG/2 ML
4 VIAL (ML) INJECTION ONCE
Refills: 0 | Status: DISCONTINUED | OUTPATIENT
Start: 2025-07-18 | End: 2025-07-20

## 2025-07-18 RX ORDER — SERTRALINE 100 MG/1
1 TABLET, FILM COATED ORAL
Refills: 0 | DISCHARGE

## 2025-07-18 RX ORDER — ERGOCALCIFEROL 1.25 MG/1
0 CAPSULE ORAL
Refills: 0 | DISCHARGE

## 2025-07-18 RX ADMIN — SODIUM CHLORIDE 100 MILLILITER(S): 9 INJECTION, SOLUTION INTRAVENOUS at 09:30

## 2025-07-18 RX ADMIN — Medication 25 MICROGRAM(S): at 09:53

## 2025-07-18 RX ADMIN — APREPITANT 40 MILLIGRAM(S): 40 CAPSULE ORAL at 07:24

## 2025-07-18 RX ADMIN — Medication 25 MICROGRAM(S): at 09:29

## 2025-07-18 NOTE — H&P PST ADULT - ATTENDING COMMENTS
Patient seen and examined  Risks, benefits, and alternatives to laparoscopic cholecystectomy discussed with patient. All questions answered.  Consent signed.

## 2025-07-18 NOTE — ASU DISCHARGE PLAN (ADULT/PEDIATRIC) - B. DRINK ALCOHOL, BEER, OR WINE
Chief Complaint   Patient presents with     Physical     Patient is here for annual physical     Magui Louis CMA 1:37 PM on 4/4/2017.     Statement Selected

## 2025-07-18 NOTE — ASU DISCHARGE PLAN (ADULT/PEDIATRIC) - CARE PROVIDER_API CALL
Ben Graham)  Surgery (General Surgery)  [address], [address]  [place] [address]  Phone: [phone]  Fax: [phone]  Follow Up Time: 2 weeks

## 2025-07-18 NOTE — ASU DISCHARGE PLAN (ADULT/PEDIATRIC) - FINANCIAL ASSISTANCE
[facility] provides services at a reduced cost to those who are determined to be eligible through [facility]’s financial assistance program. Information regarding [facility]’s financial assistance program can be found by going to [email or web address] or by calling [phone].

## 2025-07-18 NOTE — ASU PATIENT PROFILE, ADULT - FALL HARM RISK - UNIVERSAL INTERVENTIONS
Bed in lowest position, wheels locked, appropriate side rails in place/Call bell, personal items and telephone in reach/Instruct patient to call for assistance before getting out of bed or chair/Non-slip footwear when patient is out of bed/[place] to call system/Physically safe environment - no spills, clutter or unnecessary equipment/Purposeful Proactive Rounding/Room/bathroom lighting operational, light cord in reach

## 2025-07-18 NOTE — H&P PST ADULT - ASSESSMENT
cholelithiasis  CAPRINI SCORE    AGE RELATED RISK FACTORS                                                             [ ] Age 41-60 years                                            (1 Point)  [ ] Age: 61-74 years                                           (2 Points)                 [ ] Age= 75 years                                                (3 Points)             DISEASE RELATED RISK FACTORS                                                       [ ] Edema in the lower extremities                 (1 Point)                     [ ] Varicose veins                                               (1 Point)                                 x[ ] BMI > 25 Kg/m2                                            (1 Point)                                  [ ] Serious infection (ie PNA)                            (1 Point)                     [ ] Lung disease ( COPD, Emphysema)            (1 Point)                                                                          [ ] Acute myocardial infarction                         (1 Point)                  [ ] Congestive heart failure (in the previous month)  (1 Point)         [ ] Inflammatory bowel disease                            (1 Point)                  [ ] Central venous access, PICC or Port               (2 points)       (within the last month)                                                                [ ] Stroke (in the previous month)                        (5 Points)    [ ] Previous or present malignancy                       (2 points)                                                                                                                                                         HEMATOLOGY RELATED FACTORS                                                         [ ] Prior episodes of VTE                                     (3 Points)                     [ ] Positive family history for VTE                      (3 Points)                  [ ] Prothrombin 56345 A                                     (3 Points)                     [ ] Factor V Leiden                                                (3 Points)                        [ ] Lupus anticoagulants                                      (3 Points)                                                           [ ] Anticardiolipin antibodies                              (3 Points)                                                       [ ] High homocysteine in the blood                   (3 Points)                                             [ ] Other congenital or acquired thrombophilia      (3 Points)                                                [ ] Heparin induced thrombocytopenia                  (3 Points)                                        MOBILITY RELATED FACTORS  [ ] Bed rest                                                         (1 Point)  [ ] Plaster cast                                                    (2 points)  [ ] Bed bound for more than 72 hours           (2 Points)    GENDER SPECIFIC FACTORS  [ ] Pregnancy or had a baby within the last month   (1 Point)  [ ] Post-partum < 6 weeks                                   (1 Point)  [ ] Hormonal therapy  or oral contraception   (1 Point)  [ ] History of pregnancy complications              (1 point)  [ ] Unexplained or recurrent              (1 Point)    OTHER RISK FACTORS                                           (1 Point)  [ ] BMI >40, smoking, diabetes requiring insulin, chemotherapy  blood transfusions and length of surgery over 2 hours    SURGERY RELATED RISK FACTORS  [ ]  Section within the last month     (1 Point)  [ ] Minor surgery                                                  (1 Point)  [ ] Arthroscopic surgery                                       (2 Points)  [x ] Planned major surgery lasting more            (2 Points)      than 45 minutes     [ ] Elective hip or knee joint replacement       (5 points)       surgery                                                TRAUMA RELATED RISK FACTORS  [ ] Fracture of the hip, pelvis, or leg                       (5 Points)  [ ] Spinal cord injury resulting in paralysis             (5 points)       (in the previous month)    [ ] Paralysis  (less than 1 month)                             (5 Points)  [ ] Multiple Trauma within 1 month                        (5 Points)    Total Score [    3    ]    Caprini Score 0-2: Low Risk, NO VTE prophylaxis required for most patients, encourage ambulation  Caprini Score 3-6: Moderate Risk , pharmacologic VTE prophylaxis is indicated for most patients (in the absence of contraindications)  Caprini Score Greater than or =7: High risk, pharmocologic VTE prophylaxis indicated for most patients (in the absence of contraindications)

## 2025-07-18 NOTE — ASU DISCHARGE PLAN (ADULT/PEDIATRIC) - NS MD DC FALL RISK RISK
For information on Fall & Injury Prevention, visit: [email or web address] OR  [email or web address] OR  https://www.cdc.gov/steadi/patient.html

## 2025-07-21 LAB — SURGICAL PATHOLOGY STUDY: SIGNIFICANT CHANGE UP

## 2025-07-22 DIAGNOSIS — K81.1 CHRONIC CHOLECYSTITIS: ICD-10-CM

## 2025-07-31 ENCOUNTER — APPOINTMENT (OUTPATIENT)
Dept: SURGERY | Facility: CLINIC | Age: 35
End: 2025-07-31
Payer: COMMERCIAL

## 2025-07-31 VITALS
TEMPERATURE: 97.1 F | WEIGHT: 198 LBS | BODY MASS INDEX: 36.44 KG/M2 | HEART RATE: 65 BPM | RESPIRATION RATE: 16 BRPM | DIASTOLIC BLOOD PRESSURE: 68 MMHG | OXYGEN SATURATION: 98 % | SYSTOLIC BLOOD PRESSURE: 101 MMHG | HEIGHT: 62 IN

## 2025-07-31 PROCEDURE — 99024 POSTOP FOLLOW-UP VISIT: CPT

## 2025-08-19 ENCOUNTER — APPOINTMENT (OUTPATIENT)
Dept: HUMAN REPRODUCTION | Facility: CLINIC | Age: 35
End: 2025-08-19
Payer: COMMERCIAL

## 2025-08-19 PROCEDURE — 76857 US EXAM PELVIC LIMITED: CPT | Mod: NC

## 2025-08-19 PROCEDURE — 36415 COLL VENOUS BLD VENIPUNCTURE: CPT | Mod: NC

## 2025-08-19 PROCEDURE — 99213 OFFICE O/P EST LOW 20 MIN: CPT | Mod: NC

## 2025-08-26 ENCOUNTER — APPOINTMENT (OUTPATIENT)
Dept: HUMAN REPRODUCTION | Facility: CLINIC | Age: 35
End: 2025-08-26
Payer: COMMERCIAL

## 2025-08-26 PROCEDURE — 76857 US EXAM PELVIC LIMITED: CPT | Mod: NC

## 2025-08-26 PROCEDURE — 36415 COLL VENOUS BLD VENIPUNCTURE: CPT | Mod: NC

## 2025-08-26 PROCEDURE — 99213 OFFICE O/P EST LOW 20 MIN: CPT | Mod: NC

## 2025-08-29 ENCOUNTER — APPOINTMENT (OUTPATIENT)
Dept: HUMAN REPRODUCTION | Facility: CLINIC | Age: 35
End: 2025-08-29
Payer: COMMERCIAL

## 2025-08-29 PROCEDURE — 36415 COLL VENOUS BLD VENIPUNCTURE: CPT | Mod: NC

## 2025-08-29 PROCEDURE — 99213 OFFICE O/P EST LOW 20 MIN: CPT | Mod: NC

## 2025-08-29 PROCEDURE — 76857 US EXAM PELVIC LIMITED: CPT | Mod: NC

## 2025-09-02 ENCOUNTER — APPOINTMENT (OUTPATIENT)
Dept: HUMAN REPRODUCTION | Facility: CLINIC | Age: 35
End: 2025-09-02
Payer: COMMERCIAL

## 2025-09-02 PROCEDURE — 99213 OFFICE O/P EST LOW 20 MIN: CPT | Mod: NC

## 2025-09-02 PROCEDURE — 36415 COLL VENOUS BLD VENIPUNCTURE: CPT | Mod: NC

## 2025-09-02 PROCEDURE — 76857 US EXAM PELVIC LIMITED: CPT | Mod: NC

## (undated) DEVICE — VACUUM CURETTE BERKLEY OLYMPUS CURVED 11MM

## (undated) DEVICE — DRAPE LIGHT HANDLE COVER (GREEN)

## (undated) DEVICE — BASIN SET SINGLE

## (undated) DEVICE — SYR LUER LOK 20CC

## (undated) DEVICE — VACUUM CURETTE BUSSE HOSP CURVED 9MM

## (undated) DEVICE — DRAPE 1/2 SHEET 40X57"

## (undated) DEVICE — GLV 7 PROTEXIS (WHITE)

## (undated) DEVICE — VACUUM CURETTE BUSSE HOSP CURVED 12MM

## (undated) DEVICE — VACUUM CURETTE BERKLEY OLYMPUS CURVED 16MM X 1/2"

## (undated) DEVICE — ELCTR REM POLYHESIVE PATIENT RETURN ELECTRODE ADULT

## (undated) DEVICE — TROCAR COVIDIEN VERSAPORT BLADELESS OPTICAL 5MM STANDARD

## (undated) DEVICE — TUBING UTERINE ASPIRATION 3/8" X 6FT W/O ADAPTER

## (undated) DEVICE — SOL IRR POUR NS 0.9% 500ML

## (undated) DEVICE — PACK GENERAL LAPAROSCOPY

## (undated) DEVICE — TUBING STRYKEFLOW II SUCTION / IRRIGATOR

## (undated) DEVICE — DRAPE TOWEL BLUE 17" X 24"

## (undated) DEVICE — TROCAR APPLIED MEDICAL KII BALLOON BLUNT TIP 12MM X 100MM

## (undated) DEVICE — WARMING BLANKET UPPER ADULT

## (undated) DEVICE — VACUUM CURETTE MEDGYN CURVED 13MM

## (undated) DEVICE — VACUUM CURETTE BERKLEY OLYMPUS F TIP 6MM

## (undated) DEVICE — TUBING STRYKER PNEUMOCLEAR SMOKE HEAT HUMID

## (undated) DEVICE — Device

## (undated) DEVICE — VACUUM CURETTE BERKLEY OLYMPUS CURVED 9MM

## (undated) DEVICE — SUT VICRYL 0 27" UR-6

## (undated) DEVICE — VACUUM CURETTE BUSSE HOSP CURVED 11MM

## (undated) DEVICE — VACUUM CURETTE BUSSE HOSP CURVED 10MM

## (undated) DEVICE — TUBING INSUFFLATION LAP FILTER 10FT

## (undated) DEVICE — VACUUM CURETTE BERKLEY OLYMPUS CURVED 7MM

## (undated) DEVICE — D HELP - CLEARVIEW CLEARIFY SYSTEM

## (undated) DEVICE — VACUUM CURETTE BUSSE HOSP CURVED 14MM

## (undated) DEVICE — TUBING OLYMPUS INSUFFLATION

## (undated) DEVICE — PACK LITHOTOMY

## (undated) DEVICE — CANISTER DISPOSABLE THIN WALL 3000CC

## (undated) DEVICE — WARMING BLANKET FULL UNDERBODY

## (undated) DEVICE — PROTECTOR HEEL / ELBOW FLUFFY

## (undated) DEVICE — SOL IRR POUR H2O 500ML

## (undated) DEVICE — VACUUM CURETTE BERKLEY OLYMPUS CURVED 8MM

## (undated) DEVICE — ENDOCATCH 10MM

## (undated) DEVICE — SOL IRR POUR NS 0.9% 1000ML

## (undated) DEVICE — ELCTR BOVIE TIP BLADE INSULATED 2.75" EDGE

## (undated) DEVICE — DRSG STERISTRIPS 0.5 X 4"

## (undated) DEVICE — PREP BETADINE KIT

## (undated) DEVICE — DRAPE 3/4 SHEET 52X76"

## (undated) DEVICE — SUT MONOCRYL 4-0 27" PS-2 UNDYED

## (undated) DEVICE — TUBING HYDRO-SURG PLUS IRRIGATOR W SMOKEVAC & PROBE

## (undated) DEVICE — POSITIONER STRAP ARMBOARD VELCRO TS-30

## (undated) DEVICE — VACUUM CURETTE BERKLEY OLYMPUS CURVED 12MM

## (undated) DEVICE — SOCK SPECIMEN 3/8"-1/2" MALE PORT

## (undated) DEVICE — TIP METZENBAUM SCISSOR MONOPOLAR ENDOCUT (ORANGE)

## (undated) DEVICE — FRA-ESU BOVIE FORCE TRIAD T6D04548DX: Type: DURABLE MEDICAL EQUIPMENT

## (undated) DEVICE — VACUUM CURETTE MEDGYN CURVED 8MM

## (undated) DEVICE — VACUUM CURETTE BUSSE HOSP STRAIGHT 7MM

## (undated) DEVICE — VENODYNE/SCD SLEEVE CALF MEDIUM

## (undated) DEVICE — ELCTR GROUNDING PAD ADULT COVIDIEN

## (undated) DEVICE — BLADE SURGICAL #15 CARBON